# Patient Record
Sex: MALE | Race: BLACK OR AFRICAN AMERICAN | NOT HISPANIC OR LATINO | Employment: UNEMPLOYED | URBAN - METROPOLITAN AREA
[De-identification: names, ages, dates, MRNs, and addresses within clinical notes are randomized per-mention and may not be internally consistent; named-entity substitution may affect disease eponyms.]

---

## 2017-01-13 PROBLEM — R50.9 FEVER: Status: ACTIVE | Noted: 2017-01-13

## 2017-01-13 PROBLEM — R10.13 EPIGASTRIC PAIN: Status: ACTIVE | Noted: 2017-01-13

## 2017-01-13 PROBLEM — E11.9 DIABETES MELLITUS: Status: ACTIVE | Noted: 2017-01-13

## 2017-01-13 PROBLEM — R59.0 PERIAORTIC LYMPHADENOPATHY: Status: ACTIVE | Noted: 2017-01-13

## 2017-01-13 PROBLEM — I10 HYPERTENSION: Status: ACTIVE | Noted: 2017-01-13

## 2017-01-15 PROBLEM — K25.3 ACUTE GASTRIC ULCER WITHOUT HEMORRHAGE OR PERFORATION: Status: ACTIVE | Noted: 2017-01-15

## 2017-02-06 PROBLEM — C82.93 NODULAR LYMPHOMA OF INTRA-ABDOMINAL LYMPH NODES: Status: ACTIVE | Noted: 2017-02-06

## 2017-02-15 PROBLEM — C81.90 HODGKIN LYMPHOMA: Status: ACTIVE | Noted: 2017-02-15

## 2017-03-02 PROBLEM — E86.9 VOLUME DEPLETION: Status: ACTIVE | Noted: 2017-03-02

## 2017-03-02 PROBLEM — C81.19: Status: ACTIVE | Noted: 2017-03-02

## 2017-03-03 PROBLEM — E66.01 MORBID OBESITY WITH BMI OF 40.0-44.9, ADULT: Status: ACTIVE | Noted: 2017-03-03

## 2017-03-06 ENCOUNTER — INFUSION (OUTPATIENT)
Dept: INFUSION THERAPY | Facility: HOSPITAL | Age: 52
End: 2017-03-06
Attending: INTERNAL MEDICINE
Payer: MEDICAID

## 2017-03-06 ENCOUNTER — DOCUMENTATION ONLY (OUTPATIENT)
Dept: INFUSION THERAPY | Facility: HOSPITAL | Age: 52
End: 2017-03-06

## 2017-03-06 VITALS
HEART RATE: 154 BPM | RESPIRATION RATE: 26 BRPM | DIASTOLIC BLOOD PRESSURE: 78 MMHG | WEIGHT: 296.5 LBS | BODY MASS INDEX: 43.92 KG/M2 | SYSTOLIC BLOOD PRESSURE: 125 MMHG | TEMPERATURE: 102 F | HEIGHT: 69 IN

## 2017-03-06 DIAGNOSIS — C82.93 NODULAR LYMPHOMA OF INTRA-ABDOMINAL LYMPH NODES: ICD-10-CM

## 2017-03-06 DIAGNOSIS — C81.19 NODULAR SCLEROSIS HODGKIN LYMPHOMA OF EXTRANODAL SITE EXCLUDING SPLEEN AND OTHER SOLID ORGANS: Primary | ICD-10-CM

## 2017-03-06 PROBLEM — R65.20 SEVERE SEPSIS: Status: ACTIVE | Noted: 2017-03-06

## 2017-03-06 PROBLEM — A41.9 SEVERE SEPSIS: Status: ACTIVE | Noted: 2017-03-06

## 2017-03-06 PROCEDURE — 25000003 PHARM REV CODE 250: Mod: PN | Performed by: INTERNAL MEDICINE

## 2017-03-06 PROCEDURE — 96413 CHEMO IV INFUSION 1 HR: CPT | Mod: PN

## 2017-03-06 PROCEDURE — 63600175 PHARM REV CODE 636 W HCPCS: Mod: PN | Performed by: INTERNAL MEDICINE

## 2017-03-06 PROCEDURE — 96417 CHEMO IV INFUS EACH ADDL SEQ: CPT | Mod: PN

## 2017-03-06 PROCEDURE — 96375 TX/PRO/DX INJ NEW DRUG ADDON: CPT | Mod: PN

## 2017-03-06 PROCEDURE — 96367 TX/PROPH/DG ADDL SEQ IV INF: CPT | Mod: PN

## 2017-03-06 PROCEDURE — 96411 CHEMO IV PUSH ADDL DRUG: CPT | Mod: PN

## 2017-03-06 PROCEDURE — 96376 TX/PRO/DX INJ SAME DRUG ADON: CPT | Mod: PN

## 2017-03-06 RX ORDER — METHYLPREDNISOLONE SOD SUCC 125 MG
125 VIAL (EA) INJECTION ONCE
Status: COMPLETED | OUTPATIENT
Start: 2017-03-06 | End: 2017-03-06

## 2017-03-06 RX ORDER — BLEOMYCIN 30 [USP'U]/1
2 POWDER, FOR SOLUTION INTRAMUSCULAR; INTRAPLEURAL; INTRAVENOUS; SUBCUTANEOUS
Status: COMPLETED | OUTPATIENT
Start: 2017-03-06 | End: 2017-03-06

## 2017-03-06 RX ORDER — DIPHENHYDRAMINE HYDROCHLORIDE 50 MG/ML
50 INJECTION INTRAMUSCULAR; INTRAVENOUS ONCE
Status: COMPLETED | OUTPATIENT
Start: 2017-03-06 | End: 2017-03-06

## 2017-03-06 RX ORDER — SODIUM CHLORIDE 0.9 % (FLUSH) 0.9 %
10 SYRINGE (ML) INJECTION
Status: DISCONTINUED | OUTPATIENT
Start: 2017-03-06 | End: 2017-03-06 | Stop reason: HOSPADM

## 2017-03-06 RX ORDER — FAMOTIDINE 20 MG/50ML
20 INJECTION, SOLUTION INTRAVENOUS
Status: COMPLETED | OUTPATIENT
Start: 2017-03-06 | End: 2017-03-06

## 2017-03-06 RX ORDER — SODIUM CHLORIDE 0.9 % (FLUSH) 0.9 %
10 SYRINGE (ML) INJECTION
Status: CANCELLED | OUTPATIENT
Start: 2017-03-07

## 2017-03-06 RX ORDER — DOXORUBICIN HYDROCHLORIDE 2 MG/ML
25 INJECTION, SOLUTION INTRAVENOUS
Status: COMPLETED | OUTPATIENT
Start: 2017-03-06 | End: 2017-03-06

## 2017-03-06 RX ORDER — MEPERIDINE HYDROCHLORIDE 50 MG/ML
25 INJECTION INTRAMUSCULAR; INTRAVENOUS; SUBCUTANEOUS ONCE
Status: COMPLETED | OUTPATIENT
Start: 2017-03-06 | End: 2017-03-06

## 2017-03-06 RX ORDER — MEPERIDINE HYDROCHLORIDE 25 MG/ML
50 INJECTION INTRAMUSCULAR; INTRAVENOUS; SUBCUTANEOUS ONCE
Status: COMPLETED | OUTPATIENT
Start: 2017-03-06 | End: 2017-03-06

## 2017-03-06 RX ORDER — HEPARIN 100 UNIT/ML
500 SYRINGE INTRAVENOUS
Status: CANCELLED | OUTPATIENT
Start: 2017-03-07

## 2017-03-06 RX ADMIN — PROMETHAZINE HYDROCHLORIDE 12.5 MG: 25 INJECTION INTRAMUSCULAR; INTRAVENOUS at 05:03

## 2017-03-06 RX ADMIN — BLEOMYCIN 2 UNITS: 15 INJECTION, POWDER, LYOPHILIZED, FOR SOLUTION INTRAMUSCULAR; INTRAPLEURAL; INTRAVENOUS; SUBCUTANEOUS at 12:03

## 2017-03-06 RX ADMIN — DIPHENHYDRAMINE HYDROCHLORIDE 50 MG: 50 INJECTION INTRAMUSCULAR; INTRAVENOUS at 04:03

## 2017-03-06 RX ADMIN — FAMOTIDINE 20 MG: 20 INJECTION, SOLUTION INTRAVENOUS at 04:03

## 2017-03-06 RX ADMIN — SODIUM CHLORIDE 150 MG: 9 INJECTION, SOLUTION INTRAVENOUS at 01:03

## 2017-03-06 RX ADMIN — MEPERIDINE HYDROCHLORIDE 25 MG: 50 INJECTION, SOLUTION INTRAMUSCULAR; INTRAVENOUS; SUBCUTANEOUS at 05:03

## 2017-03-06 RX ADMIN — METHYLPREDNISOLONE SODIUM SUCCINATE 125 MG: 125 INJECTION, POWDER, FOR SOLUTION INTRAMUSCULAR; INTRAVENOUS at 04:03

## 2017-03-06 RX ADMIN — MEPERIDINE HYDROCHLORIDE 50 MG: 25 INJECTION INTRAMUSCULAR; INTRAVENOUS; SUBCUTANEOUS at 05:03

## 2017-03-06 RX ADMIN — BLEOMYCIN 23 UNITS: 15 INJECTION, POWDER, LYOPHILIZED, FOR SOLUTION INTRAMUSCULAR; INTRAPLEURAL; INTRAVENOUS; SUBCUTANEOUS at 02:03

## 2017-03-06 RX ADMIN — DOXORUBICIN HYDROCHLORIDE 62 MG: 2 INJECTION, SOLUTION INTRAVENOUS at 02:03

## 2017-03-06 RX ADMIN — SODIUM CHLORIDE, PRESERVATIVE FREE 10 ML: 5 INJECTION INTRAVENOUS at 11:03

## 2017-03-06 RX ADMIN — PALONOSETRON HYDROCHLORIDE 0.25 MG: 0.25 INJECTION INTRAVENOUS at 01:03

## 2017-03-06 RX ADMIN — SODIUM CHLORIDE 1000 ML: 0.9 INJECTION, SOLUTION INTRAVENOUS at 11:03

## 2017-03-06 RX ADMIN — SODIUM CHLORIDE 26.9 MG: 9 INJECTION, SOLUTION INTRAVENOUS at 12:03

## 2017-03-06 RX ADMIN — DACARBAZINE 940 MG: 10 INJECTION, POWDER, FOR SOLUTION INTRAVENOUS at 03:03

## 2017-03-06 RX ADMIN — VINBLASTINE SULFATE 15 MG: 1 INJECTION INTRAVENOUS at 03:03

## 2017-03-06 NOTE — NURSING
1642 treatment complete, flushed RCW port, pt began shaking, stating that he is cold, post treatment temp 101.5, unable to obtain BP as shaking continued to increase, heart rate up to 150's, O2 sat on RA dropped from 96% to 91%, added O2 3L per NC, pt denies any pain, 1653 Meena RICH notified, 1656 administered hypersensitivity reaction kit per protocol + Demerol 25mg IVP, 1705 Dr Corley and Meena Guerra arrived chair side to assess pt, administered second dose 25mg Demerol IVP and phenergan 12.5mg IVPB rbvo per Dr Corley, Rigors continue, ambulance called to bring pt to ER for further evaluation per Dr Corley, 1720 pt left clinic per stretcher accompanied by EMS to go to ER per ambulance

## 2017-03-06 NOTE — MR AVS SNAPSHOT
Patient Information     Patient Name Sex Fransico Sena Male 1965      Visit Information        Provider Department Dept Phone Center    3/6/2017 10:30 AM CHAIR 11, [facility] CHEMO [facility] [phone] [facility] at [facility]      Patient Instructions     None      Your Current Medications Are     amlodipine (NORVASC) 5 MG tablet    dicyclomine (BENTYL) 10 MG capsule    insulin glargine (LANTUS) 100 unit/mL injection    lisinopril (PRINIVIL,ZESTRIL) 40 MG tablet    naproxen (NAPROSYN) 375 MG tablet    pantoprazole (PROTONIX) 40 MG tablet    hydrocodone-acetaminophen 10-325mg (NORCO)  mg Tab    ondansetron (ZOFRAN-ODT) 8 MG TbDL    prochlorperazine (COMPAZINE) 10 MG tablet    hydrocodone-acetaminophen 10-325mg (NORCO)  mg Tab (Discontinued)      Facility-Administered Medications     bleomycin (BLEOCIN) 23 Units in sodium chloride 0.9% 100 mL chemo infusion    bleomycin injection 2 Units    dacarbazine (DTIC) 940 mg in dextrose 5 % 250 mL chemo infusion    diphenhydrAMINE injection 50 mg    DOXOrubicin chemo injection 62 mg    famotidine IVPB 20 mg    fosaprepitant 150 mg in sodium chloride 0.9% 150 mL IVPB    meperidine (PF) injection 50 mg    methylPREDNISolone sodium succinate injection 125 mg    palonosetron 0.25mg/dexamethasone 10mg IVPB    promethazine (PHENERGAN) 12.5 mg in sodium chloride 0.9% IVPB    rasburicase (ELITEK) 26.9 mg in sodium chloride 0.9% 50 mL IVPB    sodium chloride 0.9% bolus 1,000 mL    sodium chloride 0.9% flush 10 mL    vinBLAStine (VELBAN) 15 mg in sodium chloride 0.9% 50 mL chemo infusion      Appointments for Next Year     3/7/2017  2:30 PM INFUSION 150 MIN (150 min.) [facility]    Arrive at check-in approximately 15 minutes before your scheduled appointment time. Bring all outside medical records and imaging, along with a list of your current medications and insurance card.    1st Floor     "3/8/2017  2:30 PM INFUSION 150 MIN (150 min.) [facility] CHAIR 02, [facility] CHEMO    Arrive at check-in approximately 15 minutes before your scheduled appointment time. Bring all outside medical records and imaging, along with a list of your current medications and insurance card.    [facility] Floor    3/9/2017  2:30 PM INFUSION 150 MIN (150 min.) [facility] CHAIR 31, [facility] CHEMO    Arrive at check-in approximately 15 minutes before your scheduled appointment time. Bring all outside medical records and imaging, along with a list of your current medications and insurance card.    [facility] Floor    3/10/2017  2:30 PM INFUSION 150 MIN (150 min.) [facility] CHAIR 31, [facility] CHEMO    Arrive at check-in approximately 15 minutes before your scheduled appointment time. Bring all outside medical records and imaging, along with a list of your current medications and insurance card.    [facility] Floor    3/20/2017 10:30 AM INFUSION 240 MIN (240 min.) [facility] CHAIR 15, [facility] CHEMO    Arrive at check-in approximately 15 minutes before your scheduled appointment time. Bring all outside medical records and imaging, along with a list of your current medications and insurance card.    [facility] Floor    4/3/2017 10:30 AM INFUSION 240 MIN (240 min.) [facility] CHAIR 15, [facility] CHEMO    Arrive at check-in approximately 15 minutes before your scheduled appointment time. Bring all outside medical records and imaging, along with a list of your current medications and insurance card.    1st Floor         Default Flowsheet Data (last 24 hours)      Amb Complex Vitals Marcio        03/06/17 1650 03/06/17 1045             Measurements    Weight  134.5 kg (296 lb 8 oz)       Height  5' 9" (1.753 m)       BSA (Calculated - sq m)  2.56 sq meters       BMI (Calculated)  43.9       BP  125/78       Temp (!)  101.5 °F (38.6 °C) (!)  100.7 °F (38.2 °C)       " Pulse (!)  150 103       Resp  20       Pain Assessment    Pain Score  Zero               Allergies     No Known Allergies      Medications You Received from 03/05/2017 1713 to 03/06/2017 1713        Date/Time Order Dose Route Action     03/06/2017 1446 bleomycin (BLEOCIN) 23 Units in sodium chloride 0.9% 100 mL chemo infusion 23 Units Intravenous New Bag     03/06/2017 1251 bleomycin injection 2 Units 2 Units Subcutaneous Given     03/06/2017 1542 dacarbazine (DTIC) 940 mg in dextrose 5 % 250 mL chemo infusion 940 mg Intravenous New Bag     03/06/2017 1438 DOXOrubicin chemo injection 62 mg 62 mg Intravenous New Bag     03/06/2017 1356 fosaprepitant 150 mg in sodium chloride 0.9% 150 mL IVPB 150 mg Intravenous New Bag     03/06/2017 1335 palonosetron 0.25mg/dexamethasone 10mg IVPB 0.25 mg Intravenous New Bag     03/06/2017 1210 rasburicase (ELITEK) 26.9 mg in sodium chloride 0.9% 50 mL IVPB 26.9 mg Intravenous New Bag     03/06/2017 1110 sodium chloride 0.9% bolus 1,000 mL 1,000 mL Intravenous New Bag     03/06/2017 1110 sodium chloride 0.9% flush 10 mL 10 mL Intravenous Given     03/06/2017 1521 vinBLAStine (VELBAN) 15 mg in sodium chloride 0.9% 50 mL chemo infusion 15 mg Intravenous New Bag      Current Discharge Medication List     Cannot display discharge medications since this is not an admission.

## 2017-03-06 NOTE — PROGRESS NOTES
: LCSW met with pt and spouse for new pt education. Explained [facility] by [facility]. Pt and spouse identified financial concerns. Pt qualifies for gas card support. Provided gas card #1. Provided written instructions for pt to apply for Cancer Care support. Completed application for [employer] funding. Invited pt and spouse to contact  as needed. Juana Estrella LCSW

## 2017-03-06 NOTE — PATIENT INSTRUCTIONS
Reviewed with pt and his wife all meds and side effects, along with self-care tips. Written material also provided. Provided with phone number to call with any concerns/questions.

## 2017-03-06 NOTE — PLAN OF CARE
Problem: Patient Care Overview  Goal: Plan of Care Review  Outcome: Ongoing (interventions implemented as appropriate)  Pt developed Rigors post treatment, left clinic per stretcher with EMS to go to [facility] ER for further evaluation

## 2017-03-07 ENCOUNTER — DOCUMENTATION ONLY (OUTPATIENT)
Dept: INFUSION THERAPY | Facility: HOSPITAL | Age: 52
End: 2017-03-07

## 2017-03-07 PROBLEM — E66.01 MORBID OBESITY DUE TO EXCESS CALORIES: Status: ACTIVE | Noted: 2017-03-07

## 2017-03-07 NOTE — PROGRESS NOTES
Nutrition: Late entry. Met with pt and pts wife for new patient julieta on 3/7/2017. Pt did not complete the nutrition screen however he is currently a moderate nutritional risk. Pt informed me that he lost 35#s since January 13th. Pt informed me that he lost his appetite but it is now starting to come back. Wt: 306# Discussed the importance of weight maintenance. Disussed the improtance of eating protein rich foods. Discussed the importance of eating small frequent meals. Discussed the nausea MGT. Discussed food safety. Pt verbalized understanding. Will follow up at cycle 2. Tierney Coleman,MS, RD, LDN

## 2017-03-20 ENCOUNTER — INFUSION (OUTPATIENT)
Dept: INFUSION THERAPY | Facility: HOSPITAL | Age: 52
End: 2017-03-20
Attending: INTERNAL MEDICINE
Payer: MEDICAID

## 2017-03-20 VITALS
BODY MASS INDEX: 41.35 KG/M2 | DIASTOLIC BLOOD PRESSURE: 81 MMHG | WEIGHT: 280 LBS | RESPIRATION RATE: 16 BRPM | TEMPERATURE: 99 F | HEART RATE: 86 BPM | SYSTOLIC BLOOD PRESSURE: 146 MMHG

## 2017-03-20 DIAGNOSIS — C81.19 NODULAR SCLEROSIS HODGKIN LYMPHOMA OF EXTRANODAL SITE EXCLUDING SPLEEN AND OTHER SOLID ORGANS: Primary | ICD-10-CM

## 2017-03-20 PROCEDURE — 96413 CHEMO IV INFUSION 1 HR: CPT | Mod: PN

## 2017-03-20 PROCEDURE — 96417 CHEMO IV INFUS EACH ADDL SEQ: CPT | Mod: PN

## 2017-03-20 PROCEDURE — 96411 CHEMO IV PUSH ADDL DRUG: CPT | Mod: PN

## 2017-03-20 PROCEDURE — 25000003 PHARM REV CODE 250: Mod: PN | Performed by: INTERNAL MEDICINE

## 2017-03-20 PROCEDURE — 63600175 PHARM REV CODE 636 W HCPCS: Mod: PN | Performed by: INTERNAL MEDICINE

## 2017-03-20 PROCEDURE — 96367 TX/PROPH/DG ADDL SEQ IV INF: CPT | Mod: PN

## 2017-03-20 RX ORDER — DOXORUBICIN HYDROCHLORIDE 2 MG/ML
25 INJECTION, SOLUTION INTRAVENOUS
Status: COMPLETED | OUTPATIENT
Start: 2017-03-20 | End: 2017-03-20

## 2017-03-20 RX ORDER — HEPARIN 100 UNIT/ML
500 SYRINGE INTRAVENOUS
Status: DISCONTINUED | OUTPATIENT
Start: 2017-03-20 | End: 2017-03-20 | Stop reason: HOSPADM

## 2017-03-20 RX ORDER — SULFAMETHOXAZOLE AND TRIMETHOPRIM 800; 160 MG/1; MG/1
1 TABLET ORAL 2 TIMES DAILY
Refills: 0 | COMMUNITY
Start: 2017-02-07 | End: 2017-08-07

## 2017-03-20 RX ORDER — SODIUM CHLORIDE 0.9 % (FLUSH) 0.9 %
10 SYRINGE (ML) INJECTION
Status: DISCONTINUED | OUTPATIENT
Start: 2017-03-20 | End: 2017-03-20 | Stop reason: HOSPADM

## 2017-03-20 RX ADMIN — PALONOSETRON HYDROCHLORIDE 0.25 MG: 0.25 INJECTION INTRAVENOUS at 11:03

## 2017-03-20 RX ADMIN — Medication 10 ML: at 03:03

## 2017-03-20 RX ADMIN — DOXORUBICIN HYDROCHLORIDE 62 MG: 2 INJECTION, SOLUTION INTRAVENOUS at 01:03

## 2017-03-20 RX ADMIN — SODIUM CHLORIDE 150 MG: 9 INJECTION, SOLUTION INTRAVENOUS at 12:03

## 2017-03-20 RX ADMIN — SODIUM CHLORIDE: 0.9 INJECTION, SOLUTION INTRAVENOUS at 10:03

## 2017-03-20 RX ADMIN — DACARBAZINE 940 MG: 10 INJECTION, POWDER, FOR SOLUTION INTRAVENOUS at 02:03

## 2017-03-20 RX ADMIN — Medication 10 ML: at 10:03

## 2017-03-20 RX ADMIN — VINBLASTINE SULFATE 15 MG: 1 INJECTION INTRAVENOUS at 02:03

## 2017-03-20 RX ADMIN — BLEOMYCIN 25 UNITS: 15 INJECTION, POWDER, LYOPHILIZED, FOR SOLUTION INTRAMUSCULAR; INTRAPLEURAL; INTRAVENOUS; SUBCUTANEOUS at 01:03

## 2017-03-20 NOTE — PLAN OF CARE
Problem: Patient Care Overview  Goal: Plan of Care Review  Outcome: Ongoing (interventions implemented as appropriate)  Adequate for discharge.   Pt tolerated infusion without noted distress.  Reviewed upcoming appointments with pt and sister.  All questions answered.  Ambulated from infusion center independently with sister.

## 2017-03-20 NOTE — MR AVS SNAPSHOT
Patient Information     Patient Name Sex Fransico Sena Male 1965      Visit Information        Provider Department Dep Phone Center    3/20/2017 10:30 AM CHAIR 23, [facility] CHEMO [facility] [phone] [facility] at [facility]      Patient Instructions     None      Your Current Medications Are     amlodipine (NORVASC) 10 MG tablet    hydrocodone-acetaminophen 10-325mg (NORCO)  mg Tab    insulin glargine (LANTUS) 100 unit/mL injection    lisinopril (PRINIVIL,ZESTRIL) 40 MG tablet    magnesium oxide (MAG-OX) 400 mg tablet    ondansetron (ZOFRAN-ODT) 8 MG TbDL    pantoprazole (PROTONIX) 40 MG tablet    potassium chloride SA (K-DUR,KLOR-CON) 20 MEQ tablet    prochlorperazine (COMPAZINE) 10 MG tablet    sulfamethoxazole-trimethoprim 800-160mg (BACTRIM DS) 800-160 mg Tab      Facility-Administered Medications     bleomycin (BLEOCIN) 25 Units in sodium chloride 0.9% 100 mL chemo infusion    dacarbazine (DTIC) 940 mg in dextrose 5 % 250 mL chemo infusion    DOXOrubicin chemo injection 62 mg    fosaprepitant 150 mg in sodium chloride 0.9% 150 mL IVPB    heparin, porcine (PF) 100 unit/mL injection flush 500 Units    palonosetron 0.25mg/dexamethasone 10mg IVPB    sodium chloride 0.9% 100 mL flush bag    sodium chloride 0.9% flush 10 mL    vinBLAStine (VELBAN) 15 mg in sodium chloride 0.9% 50 mL chemo infusion      Appointments for Next Year     4/3/2017 10:30 AM INFUSION 240 MIN (240 min.) [facility]    Arrive at check-in approximately 15 minutes before your scheduled appointment time. Bring all outside medical records and imaging, along with a list of your current medications and insurance card.    1st Floor    2017 10:30 AM INFUSION 240 MIN (240 min.) [facility]IR [facility]    Arrive at check-in approximately 15 minutes before your scheduled appointment time. Bring all outside medical records and  imaging, along with a list of your current medications and insurance card.    1st Floor         Default Flowsheet Data (last 24 hours)      Amb Complex Vitals Marcio        03/20/17 1548 03/20/17 1025             Measurements    Weight  127 kg (280 lb)       BP (!)  146/81 (!)  173/92       Temp 98.7 °F (37.1 °C) 98.1 °F (36.7 °C)       Pulse 86 78       Resp 16 18       Pain Assessment    Pain Score Zero Zero               Allergies     No Known Allergies      Medications You Received from 03/19/2017 1549 to 03/20/2017 1549        Date/Time Order Dose Route Action     03/20/2017 1328 bleomycin (BLEOCIN) 25 Units in sodium chloride 0.9% 100 mL chemo infusion 25 Units Intravenous New Bag     03/20/2017 1424 dacarbazine (DTIC) 940 mg in dextrose 5 % 250 mL chemo infusion 940 mg Intravenous New Bag     03/20/2017 1317 DOXOrubicin chemo injection 62 mg 62 mg Intravenous New Bag     03/20/2017 1231 fosaprepitant 150 mg in sodium chloride 0.9% 150 mL IVPB 150 mg Intravenous New Bag     03/20/2017 1151 palonosetron 0.25mg/dexamethasone 10mg IVPB 0.25 mg Intravenous New Bag     03/20/2017 1045 sodium chloride 0.9% 100 mL flush bag   Intravenous New Bag     03/20/2017 1045 sodium chloride 0.9% flush 10 mL 10 mL Intravenous Given     03/20/2017 1545 sodium chloride 0.9% flush 10 mL 10 mL Intravenous Given     03/20/2017 1402 vinBLAStine (VELBAN) 15 mg in sodium chloride 0.9% 50 mL chemo infusion 15 mg Intravenous New Bag      Current Discharge Medication List     Cannot display discharge medications since this is not an admission.

## 2017-04-03 ENCOUNTER — INFUSION (OUTPATIENT)
Dept: INFUSION THERAPY | Facility: HOSPITAL | Age: 52
End: 2017-04-03
Attending: INTERNAL MEDICINE
Payer: MEDICAID

## 2017-04-03 ENCOUNTER — DOCUMENTATION ONLY (OUTPATIENT)
Dept: INFUSION THERAPY | Facility: HOSPITAL | Age: 52
End: 2017-04-03

## 2017-04-03 VITALS
HEART RATE: 74 BPM | RESPIRATION RATE: 16 BRPM | SYSTOLIC BLOOD PRESSURE: 153 MMHG | HEIGHT: 69 IN | WEIGHT: 278.69 LBS | TEMPERATURE: 99 F | BODY MASS INDEX: 41.28 KG/M2 | DIASTOLIC BLOOD PRESSURE: 86 MMHG

## 2017-04-03 DIAGNOSIS — C81.19 NODULAR SCLEROSIS HODGKIN LYMPHOMA OF EXTRANODAL SITE EXCLUDING SPLEEN AND OTHER SOLID ORGANS: Primary | ICD-10-CM

## 2017-04-03 PROCEDURE — 25000003 PHARM REV CODE 250: Mod: PN | Performed by: NURSE PRACTITIONER

## 2017-04-03 PROCEDURE — 96368 THER/DIAG CONCURRENT INF: CPT | Mod: PN

## 2017-04-03 PROCEDURE — 63600175 PHARM REV CODE 636 W HCPCS: Mod: PN | Performed by: NURSE PRACTITIONER

## 2017-04-03 PROCEDURE — 96413 CHEMO IV INFUSION 1 HR: CPT | Mod: PN

## 2017-04-03 PROCEDURE — 96366 THER/PROPH/DIAG IV INF ADDON: CPT | Mod: PN

## 2017-04-03 PROCEDURE — 25000003 PHARM REV CODE 250: Mod: PN | Performed by: INTERNAL MEDICINE

## 2017-04-03 PROCEDURE — 63600175 PHARM REV CODE 636 W HCPCS: Mod: PN | Performed by: INTERNAL MEDICINE

## 2017-04-03 PROCEDURE — 96367 TX/PROPH/DG ADDL SEQ IV INF: CPT | Mod: PN

## 2017-04-03 PROCEDURE — 96411 CHEMO IV PUSH ADDL DRUG: CPT | Mod: PN

## 2017-04-03 PROCEDURE — 96409 CHEMO IV PUSH SNGL DRUG: CPT | Mod: PN

## 2017-04-03 PROCEDURE — 96417 CHEMO IV INFUS EACH ADDL SEQ: CPT | Mod: PN

## 2017-04-03 RX ORDER — HEPARIN 100 UNIT/ML
500 SYRINGE INTRAVENOUS
Status: DISCONTINUED | OUTPATIENT
Start: 2017-04-03 | End: 2017-04-03 | Stop reason: HOSPADM

## 2017-04-03 RX ORDER — DOXORUBICIN HYDROCHLORIDE 2 MG/ML
25 INJECTION, SOLUTION INTRAVENOUS
Status: COMPLETED | OUTPATIENT
Start: 2017-04-03 | End: 2017-04-03

## 2017-04-03 RX ORDER — SODIUM CHLORIDE 0.9 % (FLUSH) 0.9 %
10 SYRINGE (ML) INJECTION
Status: DISCONTINUED | OUTPATIENT
Start: 2017-04-03 | End: 2017-04-03 | Stop reason: HOSPADM

## 2017-04-03 RX ADMIN — SODIUM CHLORIDE, PRESERVATIVE FREE 10 ML: 5 INJECTION INTRAVENOUS at 10:04

## 2017-04-03 RX ADMIN — VINBLASTINE SULFATE 15 MG: 1 INJECTION INTRAVENOUS at 12:04

## 2017-04-03 RX ADMIN — SODIUM CHLORIDE: 0.9 INJECTION, SOLUTION INTRAVENOUS at 10:04

## 2017-04-03 RX ADMIN — DOXORUBICIN HYDROCHLORIDE 62 MG: 2 INJECTION, SOLUTION INTRAVENOUS at 12:04

## 2017-04-03 RX ADMIN — SODIUM CHLORIDE 150 MG: 9 INJECTION, SOLUTION INTRAVENOUS at 10:04

## 2017-04-03 RX ADMIN — SODIUM CHLORIDE, PRESERVATIVE FREE 10 ML: 5 INJECTION INTRAVENOUS at 02:04

## 2017-04-03 RX ADMIN — MAGNESIUM SULFATE HEPTAHYDRATE 4 G: 500 INJECTION, SOLUTION INTRAMUSCULAR; INTRAVENOUS at 11:04

## 2017-04-03 RX ADMIN — PALONOSETRON HYDROCHLORIDE 0.25 MG: 0.25 INJECTION INTRAVENOUS at 11:04

## 2017-04-03 RX ADMIN — DACARBAZINE 940 MG: 10 INJECTION, POWDER, FOR SOLUTION INTRAVENOUS at 12:04

## 2017-04-03 NOTE — PLAN OF CARE
Problem: Patient Care Overview  Goal: Plan of Care Review  Outcome: Ongoing (interventions implemented as appropriate)  Tolerated chemo infusion without any difficulty; RTC in 2 weeks for next tx; discussed going to MD's office for schedule and RX refill.   Amb off unit independently with sister to go to MD's office

## 2017-04-17 ENCOUNTER — INFUSION (OUTPATIENT)
Dept: INFUSION THERAPY | Facility: HOSPITAL | Age: 52
End: 2017-04-17
Attending: INTERNAL MEDICINE
Payer: MEDICAID

## 2017-04-17 VITALS
SYSTOLIC BLOOD PRESSURE: 160 MMHG | HEIGHT: 69 IN | WEIGHT: 293.88 LBS | HEART RATE: 72 BPM | DIASTOLIC BLOOD PRESSURE: 91 MMHG | RESPIRATION RATE: 17 BRPM | TEMPERATURE: 99 F | BODY MASS INDEX: 43.53 KG/M2

## 2017-04-17 DIAGNOSIS — C81.19 NODULAR SCLEROSIS HODGKIN LYMPHOMA OF EXTRANODAL SITE EXCLUDING SPLEEN AND OTHER SOLID ORGANS: Primary | ICD-10-CM

## 2017-04-17 PROCEDURE — 96417 CHEMO IV INFUS EACH ADDL SEQ: CPT | Mod: PN

## 2017-04-17 PROCEDURE — 96413 CHEMO IV INFUSION 1 HR: CPT | Mod: PN

## 2017-04-17 PROCEDURE — 63600175 PHARM REV CODE 636 W HCPCS: Mod: PN | Performed by: NURSE PRACTITIONER

## 2017-04-17 PROCEDURE — 96411 CHEMO IV PUSH ADDL DRUG: CPT | Mod: PN

## 2017-04-17 PROCEDURE — 25000003 PHARM REV CODE 250: Mod: PN | Performed by: NURSE PRACTITIONER

## 2017-04-17 PROCEDURE — 96367 TX/PROPH/DG ADDL SEQ IV INF: CPT | Mod: PN

## 2017-04-17 PROCEDURE — 96415 CHEMO IV INFUSION ADDL HR: CPT | Mod: PN

## 2017-04-17 RX ORDER — DOXORUBICIN HYDROCHLORIDE 2 MG/ML
25 INJECTION, SOLUTION INTRAVENOUS
Status: COMPLETED | OUTPATIENT
Start: 2017-04-17 | End: 2017-04-17

## 2017-04-17 RX ORDER — SODIUM CHLORIDE 0.9 % (FLUSH) 0.9 %
10 SYRINGE (ML) INJECTION
Status: DISCONTINUED | OUTPATIENT
Start: 2017-04-17 | End: 2017-04-17 | Stop reason: HOSPADM

## 2017-04-17 RX ADMIN — VINBLASTINE SULFATE 15 MG: 1 INJECTION INTRAVENOUS at 12:04

## 2017-04-17 RX ADMIN — SODIUM CHLORIDE 150 MG: 9 INJECTION, SOLUTION INTRAVENOUS at 11:04

## 2017-04-17 RX ADMIN — DACARBAZINE 940 MG: 10 INJECTION, POWDER, FOR SOLUTION INTRAVENOUS at 12:04

## 2017-04-17 RX ADMIN — PALONOSETRON HYDROCHLORIDE 0.25 MG: 0.25 INJECTION INTRAVENOUS at 11:04

## 2017-04-17 RX ADMIN — DOXORUBICIN HYDROCHLORIDE 62 MG: 2 INJECTION, SOLUTION INTRAVENOUS at 11:04

## 2017-04-17 RX ADMIN — SODIUM CHLORIDE, PRESERVATIVE FREE 10 ML: 5 INJECTION INTRAVENOUS at 11:04

## 2017-04-17 RX ADMIN — SODIUM CHLORIDE: 0.9 INJECTION, SOLUTION INTRAVENOUS at 11:04

## 2017-04-17 NOTE — PLAN OF CARE
Problem: Patient Care Overview  Goal: Discharge Needs Assessment  Outcome: Ongoing (interventions implemented as appropriate)  Pt tolerated well   Will return in 2 weeks for Cycle 3   Pt instructed to call if any symptoms/ problems arise. Pt discahreged ambulatory accompanied by his wife SHELLEYONGABRIELLA

## 2017-04-17 NOTE — NURSING
Pt presents with Black splotches / hyperpigmentation spots to chest arms and lower legs. Pt denies itching/ pain. Dr caicedo office notified. ADYEE

## 2017-04-17 NOTE — MR AVS SNAPSHOT
Patient Information     Patient Name Sex Fransico Sena Male 1965      Visit Information        Provider Department Dept Phone Center    2017 10:30 AM CHAIR 32, [facility] CHEMO [facility] [phone] [facility] at [facility]      Patient Instructions     None      Your Current Medications Are     amlodipine (NORVASC) 10 MG tablet    insulin glargine (LANTUS) 100 unit/mL injection    lisinopril (PRINIVIL,ZESTRIL) 40 MG tablet    magnesium oxide (MAG-OX) 400 mg tablet    ondansetron (ZOFRAN-ODT) 8 MG TbDL    pantoprazole (PROTONIX) 40 MG tablet    potassium chloride SA (K-DUR,KLOR-CON) 20 MEQ tablet    prochlorperazine (COMPAZINE) 10 MG tablet    hydrocodone-acetaminophen 10-325mg (NORCO)  mg Tab (Discontinued)    clotrimazole (LOTRIMIN) 1 % cream    hydrocodone-acetaminophen 10-325mg (NORCO)  mg Tab    sulfamethoxazole-trimethoprim 800-160mg (BACTRIM DS) 800-160 mg Tab      Facility-Administered Medications     dacarbazine (DTIC) 940 mg in dextrose 5 % 250 mL chemo infusion    DOXOrubicin chemo injection 62 mg    fosaprepitant 150 mg in sodium chloride 0.9% 150 mL IVPB    palonosetron 0.25mg/dexamethasone 10mg IVPB    sodium chloride 0.9% 100 mL flush bag    sodium chloride 0.9% flush 10 mL    vinBLAStine (VELBAN) 15 mg in sodium chloride 0.9% 50 mL chemo infusion      Appointments for Next Year     2017 11:30 AM INFUSION 240 MIN (240 min.) [facility] CHAIR 14, [facility] CHEMO    Arrive at check-in approximately 15 minutes before your scheduled appointment time. Bring all outside medical records and imaging, along with a list of your current medications and insurance card.    [facility] Floor    2017  1:00 PM ESTABLISHED PATIENT (30 min.) [facility]cology VERONICA Antoine    Arrive at check-in approximately 15 minutes before your scheduled appointment time. Bring all outside medical records and imaging, along with a list of your  "current medications and insurance card.    5/15/2017 11:30 AM INFUSION 240 MIN (240 min.) [facility] CHAIR 12, [facility] CHEMO    Arrive at check-in approximately 15 minutes before your scheduled appointment time. Bring all outside medical records and imaging, along with a list of your current medications and insurance card.    1st Floor    5/29/2017 10:30 AM INFUSION 240 MIN (240 min.) [facility] CHAIR 07, [facility] CHEMO    Arrive at check-in approximately 15 minutes before your scheduled appointment time. Bring all outside medical records and imaging, along with a list of your current medications and insurance card.    1st Floor    6/12/2017 10:30 AM INFUSION 240 MIN (240 min.) [facility] CHAIR 15, [facility] CHEMO    Arrive at check-in approximately 15 minutes before your scheduled appointment time. Bring all outside medical records and imaging, along with a list of your current medications and insurance card.    1st Floor         Default Flowsheet Data (last 24 hours)      Amb Complex Vitals [facility]        04/17/17 1033                Measurements    Weight 133.3 kg (293 lb 14.4 oz)        Height 5' 9" (1.753 m)        BSA (Calculated - sq m) 2.55 sq meters        BMI (Calculated) 43.5        BP (!)  173/95        Temp 99 °F (37.2 °C)        Pulse 76        Resp 18                Allergies     No Known Allergies      Medications You Received from 04/16/2017 1239 to 04/17/2017 1239        Date/Time Order Dose Route Action     04/17/2017 1231 dacarbazine (DTIC) 940 mg in dextrose 5 % 250 mL chemo infusion 940 mg Intravenous New Bag     04/17/2017 1159 DOXOrubicin chemo injection 62 mg 62 mg Intravenous New Bag     04/17/2017 1121 fosaprepitant 150 mg in sodium chloride 0.9% 150 mL IVPB 150 mg Intravenous New Bag     04/17/2017 1101 palonosetron 0.25mg/dexamethasone 10mg IVPB 0.25 mg Intravenous New Bag     04/17/2017 1101 sodium chloride 0.9% 100 mL flush bag   " Intravenous New Bag     04/17/2017 1100 sodium chloride 0.9% flush 10 mL 10 mL Intravenous Given     04/17/2017 1203 vinBLAStine (VELBAN) 15 mg in sodium chloride 0.9% 50 mL chemo infusion 15 mg Intravenous New Bag      Current Discharge Medication List     Cannot display discharge medications since this is not an admission.

## 2017-04-17 NOTE — NURSING
JILLIAN Matute NP here to assess rash/ hyperpigmentation spots-- Orders received---------- Rx to be called in for patient   RBVO JILLIAN Matute NP/ AYDEE

## 2017-05-01 ENCOUNTER — INFUSION (OUTPATIENT)
Dept: INFUSION THERAPY | Facility: HOSPITAL | Age: 52
End: 2017-05-01
Attending: INTERNAL MEDICINE
Payer: MEDICAID

## 2017-05-01 VITALS
TEMPERATURE: 99 F | BODY MASS INDEX: 42.46 KG/M2 | RESPIRATION RATE: 18 BRPM | WEIGHT: 286.69 LBS | HEART RATE: 78 BPM | HEIGHT: 69 IN | SYSTOLIC BLOOD PRESSURE: 153 MMHG | DIASTOLIC BLOOD PRESSURE: 95 MMHG

## 2017-05-01 DIAGNOSIS — C81.19 NODULAR SCLEROSIS HODGKIN LYMPHOMA OF EXTRANODAL SITE EXCLUDING SPLEEN AND OTHER SOLID ORGANS: Primary | ICD-10-CM

## 2017-05-01 PROCEDURE — 63600175 PHARM REV CODE 636 W HCPCS: Mod: PN | Performed by: PHYSICIAN ASSISTANT

## 2017-05-01 PROCEDURE — 96367 TX/PROPH/DG ADDL SEQ IV INF: CPT | Mod: PN

## 2017-05-01 PROCEDURE — 25000003 PHARM REV CODE 250: Mod: PN | Performed by: PHYSICIAN ASSISTANT

## 2017-05-01 PROCEDURE — 96413 CHEMO IV INFUSION 1 HR: CPT | Mod: PN

## 2017-05-01 PROCEDURE — 96411 CHEMO IV PUSH ADDL DRUG: CPT | Mod: PN

## 2017-05-01 RX ORDER — HEPARIN 100 UNIT/ML
500 SYRINGE INTRAVENOUS
Status: DISCONTINUED | OUTPATIENT
Start: 2017-05-01 | End: 2017-05-01 | Stop reason: HOSPADM

## 2017-05-01 RX ORDER — DOXORUBICIN HYDROCHLORIDE 2 MG/ML
25 INJECTION, SOLUTION INTRAVENOUS
Status: COMPLETED | OUTPATIENT
Start: 2017-05-01 | End: 2017-05-01

## 2017-05-01 RX ORDER — SODIUM CHLORIDE 0.9 % (FLUSH) 0.9 %
10 SYRINGE (ML) INJECTION
Status: DISCONTINUED | OUTPATIENT
Start: 2017-05-01 | End: 2017-05-01 | Stop reason: HOSPADM

## 2017-05-01 RX ADMIN — SODIUM CHLORIDE 150 MG: 9 INJECTION, SOLUTION INTRAVENOUS at 12:05

## 2017-05-01 RX ADMIN — DACARBAZINE 940 MG: 10 INJECTION, POWDER, FOR SOLUTION INTRAVENOUS at 02:05

## 2017-05-01 RX ADMIN — DOXORUBICIN HYDROCHLORIDE 62 MG: 2 INJECTION, SOLUTION INTRAVENOUS at 01:05

## 2017-05-01 RX ADMIN — PALONOSETRON HYDROCHLORIDE 0.25 MG: 0.25 INJECTION INTRAVENOUS at 12:05

## 2017-05-01 RX ADMIN — VINBLASTINE SULFATE 15 MG: 1 INJECTION INTRAVENOUS at 01:05

## 2017-05-01 RX ADMIN — Medication 10 ML: at 04:05

## 2017-05-01 RX ADMIN — SODIUM CHLORIDE: 0.9 INJECTION, SOLUTION INTRAVENOUS at 12:05

## 2017-05-15 ENCOUNTER — INFUSION (OUTPATIENT)
Dept: INFUSION THERAPY | Facility: HOSPITAL | Age: 52
End: 2017-05-15
Attending: INTERNAL MEDICINE
Payer: MEDICAID

## 2017-05-15 ENCOUNTER — DOCUMENTATION ONLY (OUTPATIENT)
Dept: INFUSION THERAPY | Facility: HOSPITAL | Age: 52
End: 2017-05-15

## 2017-05-15 VITALS
BODY MASS INDEX: 42.88 KG/M2 | HEIGHT: 69 IN | WEIGHT: 289.5 LBS | SYSTOLIC BLOOD PRESSURE: 148 MMHG | HEART RATE: 76 BPM | DIASTOLIC BLOOD PRESSURE: 86 MMHG | TEMPERATURE: 99 F | RESPIRATION RATE: 18 BRPM

## 2017-05-15 DIAGNOSIS — C81.19 NODULAR SCLEROSIS HODGKIN LYMPHOMA OF EXTRANODAL SITE EXCLUDING SPLEEN AND OTHER SOLID ORGANS: Primary | ICD-10-CM

## 2017-05-15 PROCEDURE — 25000003 PHARM REV CODE 250: Mod: PN | Performed by: INTERNAL MEDICINE

## 2017-05-15 PROCEDURE — 96411 CHEMO IV PUSH ADDL DRUG: CPT | Mod: PN

## 2017-05-15 PROCEDURE — 63600175 PHARM REV CODE 636 W HCPCS: Mod: PN | Performed by: INTERNAL MEDICINE

## 2017-05-15 PROCEDURE — 63600175 PHARM REV CODE 636 W HCPCS: Mod: PN | Performed by: PHYSICIAN ASSISTANT

## 2017-05-15 PROCEDURE — 25000003 PHARM REV CODE 250: Mod: PN | Performed by: PHYSICIAN ASSISTANT

## 2017-05-15 PROCEDURE — 96366 THER/PROPH/DIAG IV INF ADDON: CPT | Mod: PN

## 2017-05-15 PROCEDURE — 96413 CHEMO IV INFUSION 1 HR: CPT | Mod: PN

## 2017-05-15 PROCEDURE — 96367 TX/PROPH/DG ADDL SEQ IV INF: CPT | Mod: PN

## 2017-05-15 RX ORDER — SODIUM CHLORIDE 0.9 % (FLUSH) 0.9 %
10 SYRINGE (ML) INJECTION
Status: DISCONTINUED | OUTPATIENT
Start: 2017-05-15 | End: 2017-05-15 | Stop reason: HOSPADM

## 2017-05-15 RX ORDER — DOXORUBICIN HYDROCHLORIDE 2 MG/ML
25 INJECTION, SOLUTION INTRAVENOUS
Status: COMPLETED | OUTPATIENT
Start: 2017-05-15 | End: 2017-05-15

## 2017-05-15 RX ORDER — HEPARIN 100 UNIT/ML
500 SYRINGE INTRAVENOUS
Status: DISCONTINUED | OUTPATIENT
Start: 2017-05-15 | End: 2017-05-15 | Stop reason: HOSPADM

## 2017-05-15 RX ADMIN — PALONOSETRON HYDROCHLORIDE 0.25 MG: 0.25 INJECTION INTRAVENOUS at 12:05

## 2017-05-15 RX ADMIN — SODIUM CHLORIDE 150 MG: 9 INJECTION, SOLUTION INTRAVENOUS at 12:05

## 2017-05-15 RX ADMIN — DACARBAZINE 940 MG: 10 INJECTION, POWDER, FOR SOLUTION INTRAVENOUS at 01:05

## 2017-05-15 RX ADMIN — VINBLASTINE SULFATE 15 MG: 1 INJECTION INTRAVENOUS at 01:05

## 2017-05-15 RX ADMIN — DOXORUBICIN HYDROCHLORIDE 62 MG: 2 INJECTION, SOLUTION INTRAVENOUS at 01:05

## 2017-05-15 RX ADMIN — MAGNESIUM SULFATE HEPTAHYDRATE 4 G: 500 INJECTION, SOLUTION INTRAMUSCULAR; INTRAVENOUS at 03:05

## 2017-05-15 RX ADMIN — SODIUM CHLORIDE: 0.9 INJECTION, SOLUTION INTRAVENOUS at 12:05

## 2017-05-15 NOTE — PROGRESS NOTES
: LCSW followed up with pt and answered questions re his SSDI application. Also assisted pt in applying for services with [facility]. Provided pt with gas card #2. Juana Estrella LCSW

## 2017-05-29 ENCOUNTER — INFUSION (OUTPATIENT)
Dept: INFUSION THERAPY | Facility: HOSPITAL | Age: 52
End: 2017-05-29
Attending: INTERNAL MEDICINE
Payer: MEDICAID

## 2017-05-29 VITALS
TEMPERATURE: 98 F | RESPIRATION RATE: 18 BRPM | WEIGHT: 297.5 LBS | DIASTOLIC BLOOD PRESSURE: 96 MMHG | HEART RATE: 78 BPM | BODY MASS INDEX: 44.06 KG/M2 | HEIGHT: 69 IN | SYSTOLIC BLOOD PRESSURE: 184 MMHG

## 2017-05-29 DIAGNOSIS — C81.19 NODULAR SCLEROSIS HODGKIN LYMPHOMA OF EXTRANODAL SITE EXCLUDING SPLEEN AND OTHER SOLID ORGANS: Primary | ICD-10-CM

## 2017-05-29 PROCEDURE — 63600175 PHARM REV CODE 636 W HCPCS: Mod: PN | Performed by: PHYSICIAN ASSISTANT

## 2017-05-29 PROCEDURE — 96411 CHEMO IV PUSH ADDL DRUG: CPT | Mod: PN

## 2017-05-29 PROCEDURE — 25000003 PHARM REV CODE 250: Mod: PN | Performed by: PHYSICIAN ASSISTANT

## 2017-05-29 PROCEDURE — 96413 CHEMO IV INFUSION 1 HR: CPT | Mod: PN

## 2017-05-29 PROCEDURE — 96367 TX/PROPH/DG ADDL SEQ IV INF: CPT | Mod: PN

## 2017-05-29 RX ORDER — HEPARIN 100 UNIT/ML
500 SYRINGE INTRAVENOUS
Status: DISCONTINUED | OUTPATIENT
Start: 2017-05-29 | End: 2017-05-29 | Stop reason: HOSPADM

## 2017-05-29 RX ORDER — DOXORUBICIN HYDROCHLORIDE 2 MG/ML
25 INJECTION, SOLUTION INTRAVENOUS
Status: COMPLETED | OUTPATIENT
Start: 2017-05-29 | End: 2017-05-29

## 2017-05-29 RX ORDER — SODIUM CHLORIDE 0.9 % (FLUSH) 0.9 %
10 SYRINGE (ML) INJECTION
Status: DISCONTINUED | OUTPATIENT
Start: 2017-05-29 | End: 2017-05-29 | Stop reason: HOSPADM

## 2017-05-29 RX ADMIN — PALONOSETRON HYDROCHLORIDE: 0.25 INJECTION INTRAVENOUS at 10:05

## 2017-05-29 RX ADMIN — SODIUM CHLORIDE: 0.9 INJECTION, SOLUTION INTRAVENOUS at 10:05

## 2017-05-29 RX ADMIN — VINBLASTINE SULFATE 15 MG: 1 INJECTION INTRAVENOUS at 11:05

## 2017-05-29 RX ADMIN — DOXORUBICIN HYDROCHLORIDE 62 MG: 2 INJECTION, SOLUTION INTRAVENOUS at 11:05

## 2017-05-29 RX ADMIN — SODIUM CHLORIDE 150 MG: 9 INJECTION, SOLUTION INTRAVENOUS at 11:05

## 2017-05-29 RX ADMIN — DACARBAZINE 940 MG: 10 INJECTION, POWDER, FOR SOLUTION INTRAVENOUS at 11:05

## 2017-06-12 ENCOUNTER — INFUSION (OUTPATIENT)
Dept: INFUSION THERAPY | Facility: HOSPITAL | Age: 52
End: 2017-06-12
Attending: INTERNAL MEDICINE
Payer: MEDICAID

## 2017-06-12 VITALS
SYSTOLIC BLOOD PRESSURE: 158 MMHG | TEMPERATURE: 99 F | RESPIRATION RATE: 18 BRPM | WEIGHT: 295.69 LBS | HEART RATE: 70 BPM | DIASTOLIC BLOOD PRESSURE: 94 MMHG | BODY MASS INDEX: 43.79 KG/M2 | HEIGHT: 69 IN

## 2017-06-12 DIAGNOSIS — C81.19 NODULAR SCLEROSIS HODGKIN LYMPHOMA OF EXTRANODAL SITE EXCLUDING SPLEEN AND OTHER SOLID ORGANS: Primary | ICD-10-CM

## 2017-06-12 PROCEDURE — 96367 TX/PROPH/DG ADDL SEQ IV INF: CPT | Mod: PN

## 2017-06-12 PROCEDURE — 96413 CHEMO IV INFUSION 1 HR: CPT | Mod: PN

## 2017-06-12 PROCEDURE — 63600175 PHARM REV CODE 636 W HCPCS: Mod: PN | Performed by: NURSE PRACTITIONER

## 2017-06-12 PROCEDURE — 25000003 PHARM REV CODE 250: Mod: PN | Performed by: NURSE PRACTITIONER

## 2017-06-12 PROCEDURE — 96411 CHEMO IV PUSH ADDL DRUG: CPT | Mod: PN

## 2017-06-12 RX ORDER — HEPARIN 100 UNIT/ML
500 SYRINGE INTRAVENOUS
Status: DISCONTINUED | OUTPATIENT
Start: 2017-06-12 | End: 2017-06-12 | Stop reason: HOSPADM

## 2017-06-12 RX ORDER — DOXORUBICIN HYDROCHLORIDE 2 MG/ML
25 INJECTION, SOLUTION INTRAVENOUS
Status: COMPLETED | OUTPATIENT
Start: 2017-06-12 | End: 2017-06-12

## 2017-06-12 RX ORDER — SODIUM CHLORIDE 0.9 % (FLUSH) 0.9 %
10 SYRINGE (ML) INJECTION
Status: COMPLETED | OUTPATIENT
Start: 2017-06-12 | End: 2017-06-12

## 2017-06-12 RX ADMIN — DOXORUBICIN HYDROCHLORIDE 62 MG: 2 INJECTION, SOLUTION INTRAVENOUS at 11:06

## 2017-06-12 RX ADMIN — SODIUM CHLORIDE, PRESERVATIVE FREE 10 ML: 5 INJECTION INTRAVENOUS at 10:06

## 2017-06-12 RX ADMIN — SODIUM CHLORIDE 150 MG: 9 INJECTION, SOLUTION INTRAVENOUS at 11:06

## 2017-06-12 RX ADMIN — PALONOSETRON HYDROCHLORIDE: 0.25 INJECTION INTRAVENOUS at 10:06

## 2017-06-12 RX ADMIN — DACARBAZINE 940 MG: 10 INJECTION, POWDER, FOR SOLUTION INTRAVENOUS at 12:06

## 2017-06-12 RX ADMIN — SODIUM CHLORIDE: 0.9 INJECTION, SOLUTION INTRAVENOUS at 10:06

## 2017-06-12 RX ADMIN — VINBLASTINE SULFATE 15 MG: 1 INJECTION INTRAVENOUS at 11:06

## 2017-06-12 NOTE — PLAN OF CARE
Problem: Chemotherapy Effects (Adult)  Goal: Signs and Symptoms of Listed Potential Problems Will be Absent, Minimized or Managed (Chemotherapy Effects)  Signs and symptoms of listed potential problems will be absent, minimized or managed by discharge/transition of care (reference Chemotherapy Effects (Adult) CPG).   Outcome: Ongoing (interventions implemented as appropriate)  Pt receiving AVD during visit today. Pt denies n/v at this time. Educated pt to begin antiemectics today. Pt verbalized understanding. Pt has +2 edema to BLLE but pt says it is improved. Will continue to monitor.

## 2017-06-12 NOTE — PLAN OF CARE
Problem: Patient Care Overview  Goal: Plan of Care Review  Outcome: Ongoing (interventions implemented as appropriate)  Pt tolerated Tx well. AVS given to pt. Pt educated to call Dr with any issues. {t verbalized understanding

## 2017-06-26 ENCOUNTER — INFUSION (OUTPATIENT)
Dept: INFUSION THERAPY | Facility: HOSPITAL | Age: 52
End: 2017-06-26
Attending: INTERNAL MEDICINE
Payer: MEDICAID

## 2017-06-26 VITALS
DIASTOLIC BLOOD PRESSURE: 99 MMHG | RESPIRATION RATE: 18 BRPM | BODY MASS INDEX: 45.07 KG/M2 | WEIGHT: 305.19 LBS | HEART RATE: 80 BPM | SYSTOLIC BLOOD PRESSURE: 157 MMHG | TEMPERATURE: 98 F

## 2017-06-26 DIAGNOSIS — C81.19 NODULAR SCLEROSIS HODGKIN LYMPHOMA OF EXTRANODAL SITE EXCLUDING SPLEEN AND OTHER SOLID ORGANS: Primary | ICD-10-CM

## 2017-06-26 PROCEDURE — 96415 CHEMO IV INFUSION ADDL HR: CPT | Mod: PN

## 2017-06-26 PROCEDURE — 96411 CHEMO IV PUSH ADDL DRUG: CPT | Mod: PN

## 2017-06-26 PROCEDURE — 63600175 PHARM REV CODE 636 W HCPCS: Mod: PN | Performed by: NURSE PRACTITIONER

## 2017-06-26 PROCEDURE — 25000003 PHARM REV CODE 250: Mod: PN | Performed by: NURSE PRACTITIONER

## 2017-06-26 PROCEDURE — 96367 TX/PROPH/DG ADDL SEQ IV INF: CPT | Mod: PN

## 2017-06-26 PROCEDURE — 96413 CHEMO IV INFUSION 1 HR: CPT | Mod: PN

## 2017-06-26 RX ORDER — DOXORUBICIN HYDROCHLORIDE 2 MG/ML
25 INJECTION, SOLUTION INTRAVENOUS
Status: COMPLETED | OUTPATIENT
Start: 2017-06-26 | End: 2017-06-26

## 2017-06-26 RX ORDER — HEPARIN 100 UNIT/ML
500 SYRINGE INTRAVENOUS
Status: DISCONTINUED | OUTPATIENT
Start: 2017-06-26 | End: 2017-06-26 | Stop reason: HOSPADM

## 2017-06-26 RX ORDER — SODIUM CHLORIDE 0.9 % (FLUSH) 0.9 %
10 SYRINGE (ML) INJECTION
Status: DISCONTINUED | OUTPATIENT
Start: 2017-06-26 | End: 2017-06-26 | Stop reason: HOSPADM

## 2017-06-26 RX ADMIN — DACARBAZINE 940 MG: 10 INJECTION, POWDER, FOR SOLUTION INTRAVENOUS at 11:06

## 2017-06-26 RX ADMIN — SODIUM CHLORIDE: 0.9 INJECTION, SOLUTION INTRAVENOUS at 10:06

## 2017-06-26 RX ADMIN — DOXORUBICIN HYDROCHLORIDE 62 MG: 2 INJECTION, SOLUTION INTRAVENOUS at 11:06

## 2017-06-26 RX ADMIN — SODIUM CHLORIDE 150 MG: 9 INJECTION, SOLUTION INTRAVENOUS at 11:06

## 2017-06-26 RX ADMIN — VINBLASTINE SULFATE 15 MG: 1 INJECTION INTRAVENOUS at 11:06

## 2017-06-26 RX ADMIN — PALONOSETRON HYDROCHLORIDE: 0.25 INJECTION INTRAVENOUS at 10:06

## 2017-07-10 ENCOUNTER — INFUSION (OUTPATIENT)
Dept: INFUSION THERAPY | Facility: HOSPITAL | Age: 52
End: 2017-07-10
Attending: INTERNAL MEDICINE
Payer: MEDICAID

## 2017-07-10 VITALS
TEMPERATURE: 98 F | WEIGHT: 302.31 LBS | HEART RATE: 78 BPM | DIASTOLIC BLOOD PRESSURE: 75 MMHG | SYSTOLIC BLOOD PRESSURE: 155 MMHG | HEIGHT: 69 IN | RESPIRATION RATE: 18 BRPM | BODY MASS INDEX: 44.78 KG/M2

## 2017-07-10 DIAGNOSIS — C81.19 NODULAR SCLEROSIS HODGKIN LYMPHOMA OF EXTRANODAL SITE EXCLUDING SPLEEN AND OTHER SOLID ORGANS: Primary | ICD-10-CM

## 2017-07-10 PROCEDURE — 25000003 PHARM REV CODE 250: Mod: PN | Performed by: NURSE PRACTITIONER

## 2017-07-10 PROCEDURE — 63600175 PHARM REV CODE 636 W HCPCS: Mod: PN | Performed by: NURSE PRACTITIONER

## 2017-07-10 PROCEDURE — 96367 TX/PROPH/DG ADDL SEQ IV INF: CPT | Mod: PN

## 2017-07-10 PROCEDURE — 96411 CHEMO IV PUSH ADDL DRUG: CPT | Mod: PN

## 2017-07-10 PROCEDURE — 96413 CHEMO IV INFUSION 1 HR: CPT | Mod: PN

## 2017-07-10 RX ORDER — SODIUM CHLORIDE 0.9 % (FLUSH) 0.9 %
10 SYRINGE (ML) INJECTION
Status: DISCONTINUED | OUTPATIENT
Start: 2017-07-10 | End: 2017-07-10 | Stop reason: HOSPADM

## 2017-07-10 RX ORDER — DOXORUBICIN HYDROCHLORIDE 2 MG/ML
25 INJECTION, SOLUTION INTRAVENOUS
Status: COMPLETED | OUTPATIENT
Start: 2017-07-10 | End: 2017-07-10

## 2017-07-10 RX ORDER — HEPARIN 100 UNIT/ML
500 SYRINGE INTRAVENOUS
Status: DISCONTINUED | OUTPATIENT
Start: 2017-07-10 | End: 2017-07-10 | Stop reason: HOSPADM

## 2017-07-10 RX ADMIN — PALONOSETRON HYDROCHLORIDE: 0.25 INJECTION INTRAVENOUS at 10:07

## 2017-07-10 RX ADMIN — DACARBAZINE 940 MG: 10 INJECTION, POWDER, FOR SOLUTION INTRAVENOUS at 12:07

## 2017-07-10 RX ADMIN — VINBLASTINE SULFATE 15 MG: 1 INJECTION INTRAVENOUS at 11:07

## 2017-07-10 RX ADMIN — DOXORUBICIN HYDROCHLORIDE 62 MG: 2 INJECTION, SOLUTION INTRAVENOUS at 11:07

## 2017-07-10 RX ADMIN — SODIUM CHLORIDE 150 MG: 9 INJECTION, SOLUTION INTRAVENOUS at 10:07

## 2017-07-10 RX ADMIN — SODIUM CHLORIDE: 9 INJECTION, SOLUTION INTRAVENOUS at 10:07

## 2017-07-20 RX ORDER — SODIUM CHLORIDE 0.9 % (FLUSH) 0.9 %
10 SYRINGE (ML) INJECTION
Status: CANCELLED | OUTPATIENT
Start: 2017-07-20

## 2017-07-20 RX ORDER — HEPARIN 100 UNIT/ML
500 SYRINGE INTRAVENOUS
Status: CANCELLED | OUTPATIENT
Start: 2017-07-20

## 2017-07-24 ENCOUNTER — INFUSION (OUTPATIENT)
Dept: INFUSION THERAPY | Facility: HOSPITAL | Age: 52
End: 2017-07-24
Attending: INTERNAL MEDICINE
Payer: MEDICAID

## 2017-07-24 VITALS
HEIGHT: 69 IN | WEIGHT: 314.19 LBS | RESPIRATION RATE: 20 BRPM | BODY MASS INDEX: 46.53 KG/M2 | DIASTOLIC BLOOD PRESSURE: 92 MMHG | TEMPERATURE: 98 F | SYSTOLIC BLOOD PRESSURE: 172 MMHG | HEART RATE: 82 BPM

## 2017-07-24 DIAGNOSIS — C81.19 NODULAR SCLEROSIS HODGKIN LYMPHOMA OF EXTRANODAL SITE EXCLUDING SPLEEN AND OTHER SOLID ORGANS: Primary | ICD-10-CM

## 2017-07-24 PROCEDURE — 25000003 PHARM REV CODE 250: Mod: PN | Performed by: INTERNAL MEDICINE

## 2017-07-24 PROCEDURE — 63600175 PHARM REV CODE 636 W HCPCS: Mod: PN | Performed by: INTERNAL MEDICINE

## 2017-07-24 PROCEDURE — 96411 CHEMO IV PUSH ADDL DRUG: CPT | Mod: PN

## 2017-07-24 PROCEDURE — 96413 CHEMO IV INFUSION 1 HR: CPT | Mod: PN

## 2017-07-24 PROCEDURE — 96367 TX/PROPH/DG ADDL SEQ IV INF: CPT | Mod: PN

## 2017-07-24 RX ORDER — SODIUM CHLORIDE 0.9 % (FLUSH) 0.9 %
10 SYRINGE (ML) INJECTION
Status: DISCONTINUED | OUTPATIENT
Start: 2017-07-24 | End: 2017-07-24 | Stop reason: HOSPADM

## 2017-07-24 RX ORDER — HEPARIN 100 UNIT/ML
500 SYRINGE INTRAVENOUS
Status: DISCONTINUED | OUTPATIENT
Start: 2017-07-24 | End: 2017-07-24 | Stop reason: HOSPADM

## 2017-07-24 RX ORDER — DOXORUBICIN HYDROCHLORIDE 2 MG/ML
25 INJECTION, SOLUTION INTRAVENOUS
Status: COMPLETED | OUTPATIENT
Start: 2017-07-24 | End: 2017-07-24

## 2017-07-24 RX ADMIN — DACARBAZINE 940 MG: 10 INJECTION, POWDER, FOR SOLUTION INTRAVENOUS at 11:07

## 2017-07-24 RX ADMIN — DOXORUBICIN HYDROCHLORIDE 62 MG: 2 INJECTION, SOLUTION INTRAVENOUS at 11:07

## 2017-07-24 RX ADMIN — VINBLASTINE SULFATE 15 MG: 1 INJECTION INTRAVENOUS at 11:07

## 2017-07-24 RX ADMIN — SODIUM CHLORIDE: 0.9 INJECTION, SOLUTION INTRAVENOUS at 10:07

## 2017-07-24 RX ADMIN — PALONOSETRON HYDROCHLORIDE: 0.25 INJECTION INTRAVENOUS at 11:07

## 2017-07-24 RX ADMIN — SODIUM CHLORIDE 150 MG: 9 INJECTION, SOLUTION INTRAVENOUS at 10:07

## 2017-07-24 NOTE — PLAN OF CARE
Problem: Chemotherapy Effects (Adult)  Goal: Signs and Symptoms of Listed Potential Problems Will be Absent, Minimized or Managed (Chemotherapy Effects)  Signs and symptoms of listed potential problems will be absent, minimized or managed by discharge/transition of care (reference Chemotherapy Effects (Adult) CPG).   Outcome: Ongoing (interventions implemented as appropriate)  TOLERATED TREATMENT WITHOUT DIFFICULTY.    Comments: TOLERATED TREATMENT WITHOUT DIFFICULTY.

## 2017-07-24 NOTE — PLAN OF CARE
Problem: Patient Care Overview  Goal: Discharge Needs Assessment  Outcome: Ongoing (interventions implemented as appropriate)  TOLERATED TREATMENT WITHOUT DIFFICULTY.P t will return 8/7 for daky 15 treatment.

## 2017-08-06 RX ORDER — HEPARIN 100 UNIT/ML
500 SYRINGE INTRAVENOUS
Status: CANCELLED | OUTPATIENT
Start: 2017-09-25

## 2017-08-06 RX ORDER — SODIUM CHLORIDE 0.9 % (FLUSH) 0.9 %
10 SYRINGE (ML) INJECTION
Status: CANCELLED | OUTPATIENT
Start: 2017-09-25

## 2017-08-07 ENCOUNTER — INFUSION (OUTPATIENT)
Dept: INFUSION THERAPY | Facility: HOSPITAL | Age: 52
End: 2017-08-07
Attending: INTERNAL MEDICINE
Payer: MEDICAID

## 2017-08-07 VITALS
HEIGHT: 69 IN | HEART RATE: 81 BPM | BODY MASS INDEX: 46.39 KG/M2 | TEMPERATURE: 99 F | SYSTOLIC BLOOD PRESSURE: 153 MMHG | RESPIRATION RATE: 18 BRPM | DIASTOLIC BLOOD PRESSURE: 94 MMHG | WEIGHT: 313.19 LBS

## 2017-08-07 DIAGNOSIS — C81.19 NODULAR SCLEROSIS HODGKIN LYMPHOMA OF EXTRANODAL SITE EXCLUDING SPLEEN AND OTHER SOLID ORGANS: Primary | ICD-10-CM

## 2017-08-07 PROCEDURE — 63600175 PHARM REV CODE 636 W HCPCS: Mod: PN | Performed by: INTERNAL MEDICINE

## 2017-08-07 PROCEDURE — 96411 CHEMO IV PUSH ADDL DRUG: CPT | Mod: PN

## 2017-08-07 PROCEDURE — 25000003 PHARM REV CODE 250: Mod: PN | Performed by: INTERNAL MEDICINE

## 2017-08-07 PROCEDURE — 96367 TX/PROPH/DG ADDL SEQ IV INF: CPT | Mod: PN

## 2017-08-07 PROCEDURE — 96413 CHEMO IV INFUSION 1 HR: CPT | Mod: PN

## 2017-08-07 PROCEDURE — A4216 STERILE WATER/SALINE, 10 ML: HCPCS | Mod: PN | Performed by: INTERNAL MEDICINE

## 2017-08-07 RX ORDER — SODIUM CHLORIDE 0.9 % (FLUSH) 0.9 %
10 SYRINGE (ML) INJECTION
Status: DISCONTINUED | OUTPATIENT
Start: 2017-08-07 | End: 2017-08-07 | Stop reason: HOSPADM

## 2017-08-07 RX ORDER — DOXORUBICIN HYDROCHLORIDE 2 MG/ML
25 INJECTION, SOLUTION INTRAVENOUS
Status: COMPLETED | OUTPATIENT
Start: 2017-08-07 | End: 2017-08-07

## 2017-08-07 RX ADMIN — DOXORUBICIN HYDROCHLORIDE 66 MG: 2 INJECTION, SOLUTION INTRAVENOUS at 12:08

## 2017-08-07 RX ADMIN — VINBLASTINE SULFATE 16 MG: 1 INJECTION INTRAVENOUS at 12:08

## 2017-08-07 RX ADMIN — DACARBAZINE 980 MG: 10 INJECTION, POWDER, FOR SOLUTION INTRAVENOUS at 12:08

## 2017-08-07 RX ADMIN — Medication 10 ML: at 10:08

## 2017-08-07 RX ADMIN — PALONOSETRON HYDROCHLORIDE: 0.25 INJECTION INTRAVENOUS at 11:08

## 2017-08-07 RX ADMIN — SODIUM CHLORIDE: 0.9 INJECTION, SOLUTION INTRAVENOUS at 10:08

## 2017-08-07 RX ADMIN — SODIUM CHLORIDE 150 MG: 9 INJECTION, SOLUTION INTRAVENOUS at 11:08

## 2017-08-07 NOTE — PLAN OF CARE
Problem: Patient Care Overview  Goal: Discharge Needs Assessment  Adequate for discharge.   Pt tolerated infusion without noted distress.  Reviewed upcoming appointments.  All questions answered.  Ambulated from infusion

## 2017-10-16 ENCOUNTER — INFUSION (OUTPATIENT)
Dept: INFUSION THERAPY | Facility: HOSPITAL | Age: 52
End: 2017-10-16
Attending: INTERNAL MEDICINE
Payer: MEDICAID

## 2017-10-16 DIAGNOSIS — C82.93 NODULAR LYMPHOMA OF INTRA-ABDOMINAL LYMPH NODES: Primary | ICD-10-CM

## 2017-10-16 PROCEDURE — 96523 IRRIG DRUG DELIVERY DEVICE: CPT | Mod: PN

## 2017-10-16 PROCEDURE — A4216 STERILE WATER/SALINE, 10 ML: HCPCS | Mod: PN | Performed by: INTERNAL MEDICINE

## 2017-10-16 PROCEDURE — 25000003 PHARM REV CODE 250: Mod: PN | Performed by: INTERNAL MEDICINE

## 2017-10-16 PROCEDURE — 63600175 PHARM REV CODE 636 W HCPCS: Mod: PN | Performed by: INTERNAL MEDICINE

## 2017-10-16 RX ORDER — SODIUM CHLORIDE 0.9 % (FLUSH) 0.9 %
10 SYRINGE (ML) INJECTION
Status: COMPLETED | OUTPATIENT
Start: 2017-10-16 | End: 2017-10-16

## 2017-10-16 RX ORDER — HEPARIN 100 UNIT/ML
500 SYRINGE INTRAVENOUS
Status: CANCELLED | OUTPATIENT
Start: 2017-10-16

## 2017-10-16 RX ORDER — SODIUM CHLORIDE 0.9 % (FLUSH) 0.9 %
10 SYRINGE (ML) INJECTION
Status: CANCELLED | OUTPATIENT
Start: 2017-10-16

## 2017-10-16 RX ORDER — HEPARIN 100 UNIT/ML
500 SYRINGE INTRAVENOUS
Status: COMPLETED | OUTPATIENT
Start: 2017-10-16 | End: 2017-10-16

## 2017-10-16 RX ADMIN — HEPARIN 500 UNITS: 100 SYRINGE at 03:10

## 2017-10-16 RX ADMIN — SODIUM CHLORIDE, PRESERVATIVE FREE 10 ML: 5 INJECTION INTRAVENOUS at 03:10

## 2017-11-20 ENCOUNTER — LAB VISIT (OUTPATIENT)
Dept: LAB | Facility: HOSPITAL | Age: 52
End: 2017-11-20
Attending: PHYSICIAN ASSISTANT
Payer: MEDICAID

## 2017-11-20 DIAGNOSIS — C81.19: ICD-10-CM

## 2017-11-20 LAB
ALBUMIN SERPL BCP-MCNC: 3.9 G/DL
ALP SERPL-CCNC: 112 U/L
ALT SERPL W/O P-5'-P-CCNC: 56 U/L
ANION GAP SERPL CALC-SCNC: 10 MMOL/L
AST SERPL-CCNC: 41 U/L
BASOPHILS # BLD AUTO: 0.02 K/UL
BASOPHILS NFR BLD: 0.4 %
BILIRUB SERPL-MCNC: 0.6 MG/DL
BUN SERPL-MCNC: 13 MG/DL
CALCIUM SERPL-MCNC: 8.8 MG/DL
CHLORIDE SERPL-SCNC: 100 MMOL/L
CO2 SERPL-SCNC: 30 MMOL/L
CREAT SERPL-MCNC: 0.92 MG/DL
CRP SERPL-MCNC: 0.7 MG/DL
DIFFERENTIAL METHOD: ABNORMAL
EOSINOPHIL # BLD AUTO: 0 K/UL
EOSINOPHIL NFR BLD: 0.4 %
ERYTHROCYTE [DISTWIDTH] IN BLOOD BY AUTOMATED COUNT: 13.6 %
ERYTHROCYTE [SEDIMENTATION RATE] IN BLOOD BY WESTERGREN METHOD: 22 MM/HR
EST. GFR  (AFRICAN AMERICAN): >60 ML/MIN/1.73 M^2
EST. GFR  (NON AFRICAN AMERICAN): >60 ML/MIN/1.73 M^2
GLUCOSE SERPL-MCNC: 285 MG/DL
HCT VFR BLD AUTO: 40.5 %
HGB BLD-MCNC: 13.1 G/DL
LDH SERPL L TO P-CCNC: 525 U/L
LYMPHOCYTES # BLD AUTO: 2.2 K/UL
LYMPHOCYTES NFR BLD: 42.8 %
MCH RBC QN AUTO: 28 PG
MCHC RBC AUTO-ENTMCNC: 32.3 G/DL
MCV RBC AUTO: 87 FL
MONOCYTES # BLD AUTO: 0.4 K/UL
MONOCYTES NFR BLD: 8.2 %
NEUTROPHILS # BLD AUTO: 2.5 K/UL
NEUTROPHILS NFR BLD: 48.2 %
NRBC BLD-RTO: 0 /100 WBC
PLATELET # BLD AUTO: 179 K/UL
PMV BLD AUTO: 10.7 FL
POTASSIUM SERPL-SCNC: 3.6 MMOL/L
PROT SERPL-MCNC: 7.6 G/DL
RBC # BLD AUTO: 4.68 M/UL
SODIUM SERPL-SCNC: 140 MMOL/L
WBC # BLD AUTO: 5.23 K/UL

## 2017-11-20 PROCEDURE — 80053 COMPREHEN METABOLIC PANEL: CPT

## 2017-11-20 PROCEDURE — 83615 LACTATE (LD) (LDH) ENZYME: CPT | Mod: PN

## 2017-11-20 PROCEDURE — 83615 LACTATE (LD) (LDH) ENZYME: CPT

## 2017-11-20 PROCEDURE — 85652 RBC SED RATE AUTOMATED: CPT

## 2017-11-20 PROCEDURE — 36415 COLL VENOUS BLD VENIPUNCTURE: CPT | Mod: PN

## 2017-11-20 PROCEDURE — 86140 C-REACTIVE PROTEIN: CPT

## 2017-11-20 PROCEDURE — 85025 COMPLETE CBC W/AUTO DIFF WBC: CPT | Mod: PN

## 2017-11-20 PROCEDURE — 80053 COMPREHEN METABOLIC PANEL: CPT | Mod: PN

## 2017-11-20 PROCEDURE — 85652 RBC SED RATE AUTOMATED: CPT | Mod: PN

## 2017-11-20 PROCEDURE — 85025 COMPLETE CBC W/AUTO DIFF WBC: CPT

## 2017-11-20 PROCEDURE — 86140 C-REACTIVE PROTEIN: CPT | Mod: PN

## 2018-04-16 ENCOUNTER — LAB VISIT (OUTPATIENT)
Dept: LAB | Facility: HOSPITAL | Age: 53
End: 2018-04-16
Attending: INTERNAL MEDICINE
Payer: MEDICAID

## 2018-04-16 DIAGNOSIS — C81.19: ICD-10-CM

## 2018-04-16 LAB
ALBUMIN SERPL BCP-MCNC: 3.4 G/DL
ALP SERPL-CCNC: 190 U/L
ALT SERPL W/O P-5'-P-CCNC: 34 U/L
ANION GAP SERPL CALC-SCNC: 10 MMOL/L
AST SERPL-CCNC: 26 U/L
BASOPHILS # BLD AUTO: 0.01 K/UL
BASOPHILS NFR BLD: 0.2 %
BILIRUB SERPL-MCNC: 0.5 MG/DL
BUN SERPL-MCNC: 12 MG/DL
CALCIUM SERPL-MCNC: 8.9 MG/DL
CHLORIDE SERPL-SCNC: 101 MMOL/L
CO2 SERPL-SCNC: 31 MMOL/L
CREAT SERPL-MCNC: 0.85 MG/DL
CRP SERPL-MCNC: 2.7 MG/DL
DIFFERENTIAL METHOD: ABNORMAL
EOSINOPHIL # BLD AUTO: 0 K/UL
EOSINOPHIL NFR BLD: 0.8 %
ERYTHROCYTE [DISTWIDTH] IN BLOOD BY AUTOMATED COUNT: 14.7 %
ERYTHROCYTE [SEDIMENTATION RATE] IN BLOOD: 102 MM/HR
EST. GFR  (AFRICAN AMERICAN): >60 ML/MIN/1.73 M^2
EST. GFR  (NON AFRICAN AMERICAN): >60 ML/MIN/1.73 M^2
GLUCOSE SERPL-MCNC: 220 MG/DL
HCT VFR BLD AUTO: 33 %
HGB BLD-MCNC: 10.1 G/DL
LDH SERPL L TO P-CCNC: 423 U/L
LYMPHOCYTES # BLD AUTO: 1.3 K/UL
LYMPHOCYTES NFR BLD: 26.3 %
MAGNESIUM SERPL-MCNC: 1.2 MG/DL
MCH RBC QN AUTO: 25.8 PG
MCHC RBC AUTO-ENTMCNC: 30.6 G/DL
MCV RBC AUTO: 84 FL
MONOCYTES # BLD AUTO: 0.3 K/UL
MONOCYTES NFR BLD: 6.6 %
NEUTROPHILS # BLD AUTO: 3.3 K/UL
NEUTROPHILS NFR BLD: 66.1 %
NRBC BLD-RTO: 0 /100 WBC
PLATELET # BLD AUTO: 267 K/UL
PMV BLD AUTO: 10 FL
POTASSIUM SERPL-SCNC: 3.6 MMOL/L
PROT SERPL-MCNC: 7.2 G/DL
RBC # BLD AUTO: 3.91 M/UL
SODIUM SERPL-SCNC: 142 MMOL/L
URATE SERPL-MCNC: 5.6 MG/DL
WBC # BLD AUTO: 5.02 K/UL

## 2018-04-16 PROCEDURE — 84550 ASSAY OF BLOOD/URIC ACID: CPT

## 2018-04-16 PROCEDURE — 86140 C-REACTIVE PROTEIN: CPT

## 2018-04-16 PROCEDURE — 85025 COMPLETE CBC W/AUTO DIFF WBC: CPT | Mod: PN

## 2018-04-16 PROCEDURE — 83615 LACTATE (LD) (LDH) ENZYME: CPT | Mod: PN

## 2018-04-16 PROCEDURE — 36415 COLL VENOUS BLD VENIPUNCTURE: CPT | Mod: PN

## 2018-04-16 PROCEDURE — 85652 RBC SED RATE AUTOMATED: CPT | Mod: PN

## 2018-04-16 PROCEDURE — 85025 COMPLETE CBC W/AUTO DIFF WBC: CPT

## 2018-04-16 PROCEDURE — 80053 COMPREHEN METABOLIC PANEL: CPT

## 2018-04-16 PROCEDURE — 83735 ASSAY OF MAGNESIUM: CPT

## 2018-04-16 PROCEDURE — 83735 ASSAY OF MAGNESIUM: CPT | Mod: PN

## 2018-04-16 PROCEDURE — 84550 ASSAY OF BLOOD/URIC ACID: CPT | Mod: PN

## 2018-04-16 PROCEDURE — 86140 C-REACTIVE PROTEIN: CPT | Mod: PN

## 2018-04-16 PROCEDURE — 80053 COMPREHEN METABOLIC PANEL: CPT | Mod: PN

## 2018-04-16 PROCEDURE — 83615 LACTATE (LD) (LDH) ENZYME: CPT

## 2018-04-16 PROCEDURE — 85652 RBC SED RATE AUTOMATED: CPT

## 2018-10-16 ENCOUNTER — LAB VISIT (OUTPATIENT)
Dept: LAB | Facility: HOSPITAL | Age: 53
End: 2018-10-16
Attending: NURSE PRACTITIONER
Payer: MEDICAID

## 2018-10-16 DIAGNOSIS — R05.9 COUGH: ICD-10-CM

## 2018-10-16 DIAGNOSIS — C81.19 NODULAR SCLEROSIS HODGKIN LYMPHOMA OF EXTRANODAL SITE EXCLUDING SPLEEN AND OTHER SOLID ORGANS: ICD-10-CM

## 2018-10-16 DIAGNOSIS — R06.2 WHEEZE: ICD-10-CM

## 2018-10-16 DIAGNOSIS — C81.19: ICD-10-CM

## 2018-10-16 LAB
ALBUMIN SERPL BCP-MCNC: 3.7 G/DL
ALP SERPL-CCNC: 89 U/L
ALT SERPL W/O P-5'-P-CCNC: 24 U/L
ANION GAP SERPL CALC-SCNC: 8 MMOL/L
AST SERPL-CCNC: 21 U/L
BASOPHILS # BLD AUTO: 0.01 K/UL
BASOPHILS NFR BLD: 0.2 %
BILIRUB SERPL-MCNC: 0.8 MG/DL
BUN SERPL-MCNC: 12 MG/DL
CALCIUM SERPL-MCNC: 9 MG/DL
CHLORIDE SERPL-SCNC: 105 MMOL/L
CO2 SERPL-SCNC: 27 MMOL/L
CREAT SERPL-MCNC: 0.95 MG/DL
DIFFERENTIAL METHOD: ABNORMAL
EOSINOPHIL # BLD AUTO: 0 K/UL
EOSINOPHIL NFR BLD: 0.2 %
ERYTHROCYTE [DISTWIDTH] IN BLOOD BY AUTOMATED COUNT: 16.1 %
EST. GFR  (AFRICAN AMERICAN): >60 ML/MIN/1.73 M^2
EST. GFR  (NON AFRICAN AMERICAN): >60 ML/MIN/1.73 M^2
GLUCOSE SERPL-MCNC: 175 MG/DL
HCT VFR BLD AUTO: 37.5 %
HGB BLD-MCNC: 11.8 G/DL
LYMPHOCYTES # BLD AUTO: 1.9 K/UL
LYMPHOCYTES NFR BLD: 46.9 %
MCH RBC QN AUTO: 27.4 PG
MCHC RBC AUTO-ENTMCNC: 31.5 G/DL
MCV RBC AUTO: 87 FL
MONOCYTES # BLD AUTO: 0.4 K/UL
MONOCYTES NFR BLD: 11 %
NEUTROPHILS # BLD AUTO: 1.7 K/UL
NEUTROPHILS NFR BLD: 41.7 %
NRBC BLD-RTO: 0 /100 WBC
PLATELET # BLD AUTO: 173 K/UL
PMV BLD AUTO: 10.1 FL
POTASSIUM SERPL-SCNC: 4 MMOL/L
PROT SERPL-MCNC: 7.3 G/DL
RBC # BLD AUTO: 4.3 M/UL
SODIUM SERPL-SCNC: 140 MMOL/L
WBC # BLD AUTO: 4.01 K/UL

## 2018-10-16 PROCEDURE — 85025 COMPLETE CBC W/AUTO DIFF WBC: CPT | Mod: PN

## 2018-10-16 PROCEDURE — 36415 COLL VENOUS BLD VENIPUNCTURE: CPT | Mod: PN

## 2018-10-16 PROCEDURE — 80053 COMPREHEN METABOLIC PANEL: CPT | Mod: PN

## 2018-10-16 PROCEDURE — 85025 COMPLETE CBC W/AUTO DIFF WBC: CPT

## 2018-10-16 PROCEDURE — 80053 COMPREHEN METABOLIC PANEL: CPT

## 2020-03-26 ENCOUNTER — LAB VISIT (OUTPATIENT)
Dept: LAB | Facility: HOSPITAL | Age: 55
End: 2020-03-26
Attending: INTERNAL MEDICINE
Payer: MEDICAID

## 2020-03-26 DIAGNOSIS — C81.00 NODULAR LYMPHOCYTE PREDOMINANT HODGKIN LYMPHOMA, UNSPECIFIED BODY REGION: ICD-10-CM

## 2020-03-26 LAB
ALBUMIN SERPL BCP-MCNC: 3.9 G/DL (ref 3.5–5.2)
ALP SERPL-CCNC: 105 U/L (ref 38–145)
ALT SERPL W/O P-5'-P-CCNC: 19 U/L (ref 0–50)
ANION GAP SERPL CALC-SCNC: 4 MMOL/L (ref 8–16)
AST SERPL-CCNC: 23 U/L (ref 17–59)
BASOPHILS # BLD AUTO: 0.02 K/UL (ref 0–0.2)
BASOPHILS NFR BLD: 0.5 % (ref 0–1.9)
BILIRUB SERPL-MCNC: 1.2 MG/DL (ref 0.2–1.3)
BUN SERPL-MCNC: 14 MG/DL (ref 9–21)
CALCIUM SERPL-MCNC: 8.6 MG/DL (ref 8.4–10.2)
CHLORIDE SERPL-SCNC: 104 MMOL/L (ref 95–110)
CO2 SERPL-SCNC: 29 MMOL/L (ref 22–31)
CREAT SERPL-MCNC: 1.15 MG/DL (ref 0.5–1.4)
DIFFERENTIAL METHOD: ABNORMAL
EOSINOPHIL # BLD AUTO: 0 K/UL (ref 0–0.5)
EOSINOPHIL NFR BLD: 0.2 % (ref 0–8)
ERYTHROCYTE [DISTWIDTH] IN BLOOD BY AUTOMATED COUNT: 17.2 % (ref 11.5–14.5)
EST. GFR  (AFRICAN AMERICAN): >60 ML/MIN/1.73 M^2
EST. GFR  (NON AFRICAN AMERICAN): >60 ML/MIN/1.73 M^2
GLUCOSE SERPL-MCNC: 223 MG/DL (ref 70–110)
HCT VFR BLD AUTO: 36.9 % (ref 40–54)
HGB BLD-MCNC: 11.2 G/DL (ref 14–18)
IMM GRANULOCYTES # BLD AUTO: 0.01 K/UL (ref 0–0.04)
IMM GRANULOCYTES NFR BLD AUTO: 0.2 % (ref 0–0.5)
LDH SERPL L TO P-CCNC: 480 U/L (ref 313–618)
LYMPHOCYTES # BLD AUTO: 1.7 K/UL (ref 1–4.8)
LYMPHOCYTES NFR BLD: 41 % (ref 18–48)
MCH RBC QN AUTO: 25.9 PG (ref 27–31)
MCHC RBC AUTO-ENTMCNC: 30.4 G/DL (ref 32–36)
MCV RBC AUTO: 85 FL (ref 82–98)
MONOCYTES # BLD AUTO: 0.3 K/UL (ref 0.3–1)
MONOCYTES NFR BLD: 7.3 % (ref 4–15)
NEUTROPHILS # BLD AUTO: 2.2 K/UL (ref 1.8–7.7)
NEUTROPHILS NFR BLD: 50.8 % (ref 38–73)
NRBC BLD-RTO: 0 /100 WBC
PLATELET # BLD AUTO: 179 K/UL (ref 150–350)
PMV BLD AUTO: 9.7 FL (ref 9.2–12.9)
POTASSIUM SERPL-SCNC: 4.1 MMOL/L (ref 3.5–5.1)
PROT SERPL-MCNC: 7.9 G/DL (ref 6–8.4)
RBC # BLD AUTO: 4.32 M/UL (ref 4.6–6.2)
SODIUM SERPL-SCNC: 137 MMOL/L (ref 136–145)
WBC # BLD AUTO: 4.24 K/UL (ref 3.9–12.7)

## 2020-03-26 PROCEDURE — 83615 LACTATE (LD) (LDH) ENZYME: CPT

## 2020-03-26 PROCEDURE — 36415 COLL VENOUS BLD VENIPUNCTURE: CPT | Mod: PN

## 2020-03-26 PROCEDURE — 85025 COMPLETE CBC W/AUTO DIFF WBC: CPT

## 2020-03-26 PROCEDURE — 80053 COMPREHEN METABOLIC PANEL: CPT

## 2020-03-26 PROCEDURE — 83615 LACTATE (LD) (LDH) ENZYME: CPT | Mod: PN

## 2020-03-26 PROCEDURE — 85025 COMPLETE CBC W/AUTO DIFF WBC: CPT | Mod: PN

## 2020-03-26 PROCEDURE — 80053 COMPREHEN METABOLIC PANEL: CPT | Mod: PN

## 2020-05-26 PROBLEM — N18.30 STAGE 3 CHRONIC KIDNEY DISEASE: Status: ACTIVE | Noted: 2020-05-26

## 2020-05-26 PROBLEM — D64.9 ANEMIA: Status: ACTIVE | Noted: 2020-05-26

## 2020-05-26 PROBLEM — E11.29 TYPE 2 DIABETES MELLITUS WITH RENAL COMPLICATION: Status: ACTIVE | Noted: 2017-01-13

## 2020-05-26 PROBLEM — U07.1 COVID-19 VIRUS INFECTION: Status: ACTIVE | Noted: 2020-05-26

## 2020-05-26 PROBLEM — I48.91 ATRIAL FIBRILLATION, NEW ONSET: Status: ACTIVE | Noted: 2020-05-26

## 2020-05-26 PROBLEM — Z87.11 HISTORY OF GASTRIC ULCER: Status: ACTIVE | Noted: 2020-05-26

## 2020-05-27 PROBLEM — R79.89 TROPONIN I ABOVE REFERENCE RANGE: Status: ACTIVE | Noted: 2020-05-27

## 2020-05-27 PROBLEM — E83.42 HYPOMAGNESEMIA: Status: ACTIVE | Noted: 2020-05-27

## 2020-05-27 PROBLEM — I50.9 ACUTE ON CHRONIC HEART FAILURE: Status: ACTIVE | Noted: 2020-05-27

## 2020-05-28 PROBLEM — S81.819A SKIN TEAR OF LOWER LEG WITHOUT COMPLICATION: Status: ACTIVE | Noted: 2020-05-28

## 2020-10-19 ENCOUNTER — LAB VISIT (OUTPATIENT)
Dept: LAB | Facility: HOSPITAL | Age: 55
End: 2020-10-19
Attending: PHYSICIAN ASSISTANT
Payer: MEDICAID

## 2020-10-19 DIAGNOSIS — C81.00 NODULAR LYMPHOCYTE PREDOMINANT HODGKIN LYMPHOMA, UNSPECIFIED BODY REGION: ICD-10-CM

## 2020-10-19 LAB
ALBUMIN SERPL BCP-MCNC: 3.7 G/DL (ref 3.5–5.2)
ALP SERPL-CCNC: 164 U/L (ref 38–145)
ALT SERPL W/O P-5'-P-CCNC: 27 U/L (ref 0–50)
ANION GAP SERPL CALC-SCNC: 8 MMOL/L (ref 8–16)
AST SERPL-CCNC: 45 U/L (ref 17–59)
BASOPHILS # BLD AUTO: 0.01 K/UL (ref 0–0.2)
BASOPHILS NFR BLD: 0.2 % (ref 0–1.9)
BILIRUB SERPL-MCNC: 2.5 MG/DL (ref 0.2–1.3)
BUN SERPL-MCNC: 20 MG/DL (ref 9–21)
CALCIUM SERPL-MCNC: 9.4 MG/DL (ref 8.4–10.2)
CHLORIDE SERPL-SCNC: 105 MMOL/L (ref 95–110)
CO2 SERPL-SCNC: 26 MMOL/L (ref 22–31)
CREAT SERPL-MCNC: 1.76 MG/DL (ref 0.5–1.4)
DIFFERENTIAL METHOD: ABNORMAL
EOSINOPHIL # BLD AUTO: 0 K/UL (ref 0–0.5)
EOSINOPHIL NFR BLD: 0.2 % (ref 0–8)
ERYTHROCYTE [DISTWIDTH] IN BLOOD BY AUTOMATED COUNT: 20.1 % (ref 11.5–14.5)
EST. GFR  (AFRICAN AMERICAN): 50 ML/MIN/1.73 M^2
EST. GFR  (NON AFRICAN AMERICAN): 43 ML/MIN/1.73 M^2
GLUCOSE SERPL-MCNC: 147 MG/DL (ref 70–110)
HCT VFR BLD AUTO: 35.5 % (ref 40–54)
HGB BLD-MCNC: 10.6 G/DL (ref 14–18)
IMM GRANULOCYTES # BLD AUTO: 0.01 K/UL (ref 0–0.04)
IMM GRANULOCYTES NFR BLD AUTO: 0.2 % (ref 0–0.5)
LDH SERPL L TO P-CCNC: 477 U/L (ref 313–618)
LYMPHOCYTES # BLD AUTO: 2 K/UL (ref 1–4.8)
LYMPHOCYTES NFR BLD: 39.5 % (ref 18–48)
MCH RBC QN AUTO: 26.6 PG (ref 27–31)
MCHC RBC AUTO-ENTMCNC: 29.9 G/DL (ref 32–36)
MCV RBC AUTO: 89 FL (ref 82–98)
MONOCYTES # BLD AUTO: 0.4 K/UL (ref 0.3–1)
MONOCYTES NFR BLD: 8.7 % (ref 4–15)
NEUTROPHILS # BLD AUTO: 2.5 K/UL (ref 1.8–7.7)
NEUTROPHILS NFR BLD: 51.2 % (ref 38–73)
NRBC BLD-RTO: 0 /100 WBC
PLATELET # BLD AUTO: 181 K/UL (ref 150–350)
PMV BLD AUTO: 9.5 FL (ref 9.2–12.9)
POTASSIUM SERPL-SCNC: 3.9 MMOL/L (ref 3.5–5.1)
PROT SERPL-MCNC: 8.1 G/DL (ref 6–8.4)
RBC # BLD AUTO: 3.99 M/UL (ref 4.6–6.2)
SODIUM SERPL-SCNC: 139 MMOL/L (ref 136–145)
WBC # BLD AUTO: 4.96 K/UL (ref 3.9–12.7)

## 2020-10-19 PROCEDURE — 85025 COMPLETE CBC W/AUTO DIFF WBC: CPT

## 2020-10-19 PROCEDURE — 85025 COMPLETE CBC W/AUTO DIFF WBC: CPT | Mod: PN

## 2020-10-19 PROCEDURE — 83615 LACTATE (LD) (LDH) ENZYME: CPT | Mod: PN

## 2020-10-19 PROCEDURE — 36415 COLL VENOUS BLD VENIPUNCTURE: CPT | Mod: PN

## 2020-10-19 PROCEDURE — 80053 COMPREHEN METABOLIC PANEL: CPT

## 2020-10-19 PROCEDURE — 83615 LACTATE (LD) (LDH) ENZYME: CPT

## 2020-10-19 PROCEDURE — 80053 COMPREHEN METABOLIC PANEL: CPT | Mod: PN

## 2020-10-20 PROBLEM — I48.0 PAF (PAROXYSMAL ATRIAL FIBRILLATION): Status: ACTIVE | Noted: 2020-10-20

## 2020-10-20 PROBLEM — I50.9 ACUTE CHF (CONGESTIVE HEART FAILURE): Status: RESOLVED | Noted: 2020-10-20 | Resolved: 2020-10-20

## 2020-10-20 PROBLEM — I50.9 ACUTE CHF (CONGESTIVE HEART FAILURE): Status: ACTIVE | Noted: 2020-10-20

## 2020-11-10 PROBLEM — I50.22 CHRONIC SYSTOLIC CONGESTIVE HEART FAILURE: Status: ACTIVE | Noted: 2020-11-10

## 2021-05-13 PROBLEM — J96.01 ACUTE RESPIRATORY FAILURE WITH HYPOXIA: Status: ACTIVE | Noted: 2021-05-13

## 2021-05-14 PROBLEM — I50.23 ACUTE ON CHRONIC SYSTOLIC CHF (CONGESTIVE HEART FAILURE): Status: ACTIVE | Noted: 2020-11-10

## 2021-05-14 PROBLEM — R74.8 ELEVATED CK: Status: ACTIVE | Noted: 2021-05-14

## 2021-05-14 PROBLEM — J18.9 PNEUMONIA: Status: ACTIVE | Noted: 2021-05-14

## 2021-05-14 PROBLEM — R74.8 ELEVATED CK: Status: RESOLVED | Noted: 2021-05-14 | Resolved: 2021-05-14

## 2021-05-15 PROBLEM — R06.02 SOB (SHORTNESS OF BREATH): Status: ACTIVE | Noted: 2021-05-15

## 2021-12-13 PROBLEM — N17.9 ACUTE KIDNEY INJURY SUPERIMPOSED ON CHRONIC KIDNEY DISEASE: Status: ACTIVE | Noted: 2021-12-13

## 2021-12-13 PROBLEM — E83.39 HYPOPHOSPHATEMIA: Status: ACTIVE | Noted: 2021-12-13

## 2021-12-13 PROBLEM — N18.9 ACUTE KIDNEY INJURY SUPERIMPOSED ON CHRONIC KIDNEY DISEASE: Status: ACTIVE | Noted: 2021-12-13

## 2021-12-13 PROBLEM — D69.6 THROMBOCYTOPENIA: Status: ACTIVE | Noted: 2021-12-13

## 2021-12-13 PROBLEM — N39.0 UTI (URINARY TRACT INFECTION): Status: ACTIVE | Noted: 2021-12-13

## 2021-12-13 PROBLEM — E11.65 TYPE 2 DIABETES MELLITUS WITH HYPERGLYCEMIA: Status: ACTIVE | Noted: 2021-12-13

## 2021-12-13 PROBLEM — E88.09 HYPOALBUMINEMIA: Status: ACTIVE | Noted: 2021-12-13

## 2021-12-13 PROBLEM — I48.91 ATRIAL FIBRILLATION WITH RAPID VENTRICULAR RESPONSE: Status: ACTIVE | Noted: 2021-12-13

## 2021-12-15 PROBLEM — E83.39 HYPOPHOSPHATEMIA: Status: RESOLVED | Noted: 2021-12-13 | Resolved: 2021-12-15

## 2021-12-15 PROBLEM — D69.6 THROMBOCYTOPENIA: Status: RESOLVED | Noted: 2021-12-13 | Resolved: 2021-12-15

## 2022-09-20 PROBLEM — N18.31 STAGE 3A CHRONIC KIDNEY DISEASE: Status: ACTIVE | Noted: 2022-09-20

## 2022-09-21 PROBLEM — I48.91 ATRIAL FIBRILLATION: Status: ACTIVE | Noted: 2020-10-20

## 2022-09-21 PROBLEM — R79.89 ELEVATED TROPONIN: Status: RESOLVED | Noted: 2020-05-27 | Resolved: 2022-09-21

## 2022-09-23 PROBLEM — G47.33 OBSTRUCTIVE SLEEP APNEA: Status: ACTIVE | Noted: 2022-09-23
